# Patient Record
Sex: MALE | Race: WHITE | NOT HISPANIC OR LATINO | Employment: OTHER | URBAN - METROPOLITAN AREA
[De-identification: names, ages, dates, MRNs, and addresses within clinical notes are randomized per-mention and may not be internally consistent; named-entity substitution may affect disease eponyms.]

---

## 2017-04-11 ENCOUNTER — OFFICE VISIT (OUTPATIENT)
Dept: FAMILY MEDICINE | Facility: CLINIC | Age: 72
End: 2017-04-11

## 2017-04-11 VITALS
HEIGHT: 71 IN | BODY MASS INDEX: 30.52 KG/M2 | HEART RATE: 44 BPM | DIASTOLIC BLOOD PRESSURE: 72 MMHG | SYSTOLIC BLOOD PRESSURE: 130 MMHG | WEIGHT: 218 LBS | TEMPERATURE: 97.6 F | OXYGEN SATURATION: 98 %

## 2017-04-11 DIAGNOSIS — I10 BENIGN ESSENTIAL HYPERTENSION: ICD-10-CM

## 2017-04-11 DIAGNOSIS — Z11.59 NEED FOR HEPATITIS C SCREENING TEST: ICD-10-CM

## 2017-04-11 DIAGNOSIS — S06.9X9S: ICD-10-CM

## 2017-04-11 DIAGNOSIS — E78.2 MIXED HYPERLIPIDEMIA: ICD-10-CM

## 2017-04-11 DIAGNOSIS — I25.810 CORONARY ARTERY DISEASE INVOLVING AUTOLOGOUS ARTERY CORONARY BYPASS GRAFT WITHOUT ANGINA PECTORIS: Primary | ICD-10-CM

## 2017-04-11 PROBLEM — S06.9X9A TRAUMATIC BRAIN INJURY WITH LOSS OF CONSCIOUSNESS (H): Status: ACTIVE | Noted: 2017-04-11

## 2017-04-11 PROCEDURE — 36415 COLL VENOUS BLD VENIPUNCTURE: CPT | Performed by: FAMILY MEDICINE

## 2017-04-11 PROCEDURE — 99214 OFFICE O/P EST MOD 30 MIN: CPT | Performed by: FAMILY MEDICINE

## 2017-04-11 RX ORDER — ATORVASTATIN CALCIUM 40 MG/1
40 TABLET, FILM COATED ORAL DAILY
Qty: 90 TABLET | Refills: 1 | Status: SHIPPED | OUTPATIENT
Start: 2017-04-11 | End: 2017-10-12

## 2017-04-11 RX ORDER — ATENOLOL 25 MG/1
25 TABLET ORAL DAILY
Qty: 90 TABLET | Refills: 1 | Status: SHIPPED | OUTPATIENT
Start: 2017-04-11 | End: 2017-10-12

## 2017-04-11 NOTE — MR AVS SNAPSHOT
After Visit Summary   4/11/2017    Jose David Zhang    MRN: [id number]           Patient Information     Date Of Birth          1945        Visit Information        Provider Department      4/11/2017 9:15 AM Jose David Griffin MD [facility]        Today's Diagnoses     Coronary artery disease involving autologous artery coronary bypass graft without angina pectoris    -  1    Mixed hyperlipidemia        Benign essential hypertension        Traumatic brain injury with loss of consciousness, unspecified duration, sequela (H)        Need for hepatitis C screening test           Follow-ups after your visit        Follow-up notes from your care team     Return in about 6 months (around 10/11/2017).      Who to contact     If you have questions or need follow up information about today's clinic visit or your schedule please contact [facility] directly at [phone].  Normal or non-critical lab and imaging results will be communicated to you by [employer], letter or phone within 4 business days after the clinic has received the results. If you do not hear from us within 7 days, please contact the clinic through [employer] or phone. If you have a critical or abnormal lab result, we will notify you by phone as soon as possible.  Submit refill requests through [employer] or call your pharmacy and they will forward the refill request to us. Please allow 3 business days for your refill to be completed.          Additional Information About Your Visit        MyChart Information     [employer] gives you secure access to your electronic health record. If you see a primary care provider, you can also send messages to your care team and make appointments. If you have questions, please call your primary care clinic.  If you do not have a primary care provider, please call [phone] and they will assist you.        Care EveryWhere ID     This is your Care EveryWhere ID.  "This could be used by other organizations to access your [facility] medical records  [id number]        Your Vitals Were     Pulse Temperature Height Pulse Oximetry BMI (Body Mass Index)       44 97.6  F (36.4  C) (Oral) 1.791 m (5' 10.5\") 98% 30.84 kg/m2        Blood Pressure from Last 3 Encounters:   04/11/17 130/72   12/07/16 130/88   10/14/16 130/70    Weight from Last 3 Encounters:   04/11/17 98.9 kg (218 lb)   12/07/16 96.2 kg (212 lb)   10/14/16 98.2 kg (216 lb 9.6 oz)              We Performed the Following     COMPREHENSIVE METABOLIC PANEL (QUEST) XCMP     Hepatits C antibody (QUEST)     Lipid Profile ([employer])     VENOUS COLLECTION          Today's Medication Changes          These changes are accurate as of: 4/11/17  9:32 AM.  If you have any questions, ask your nurse or doctor.               These medicines have changed or have updated prescriptions.        Dose/Directions    atorvastatin 40 MG tablet   Commonly known as:  LIPITOR   This may have changed:  how much to take   Used for:  Mixed hyperlipidemia        Dose:  40 mg   Take 1 tablet (40 mg) by mouth daily   Quantity:  90 tablet   Refills:  1            Where to get your medicines      These medications were [id number] - [place], [place] - [address] [place] [address] [address]  [address] [facility] [address], [facility] [address]     Phone:  [phone]     atenolol 25 MG tablet    atorvastatin 40 MG tablet                Primary Care Provider Office Phone # Fax #    Jose David Griffin [phone]       [facility] [address] [facility] [address]  [facility] [address]        Thank you!     Thank you for choosing [facility]  for your care. Our goal is always to provide you with excellent care. Hearing back from our patients is one way we can continue to improve our services. Please take a few minutes to complete the written survey that you may receive in the mail after your visit with us. " Thank you!             Your Updated Medication List - Protect others around you: Learn how to safely use, store and throw away your medicines at www.disposemymeds.org.          This list is accurate as of: 4/11/17  9:32 AM.  Always use your most recent med list.                   Brand Name Dispense Instructions for use    aspirin 81 MG tablet     100    one daily       atenolol 25 MG tablet    TENORMIN    90 tablet    Take 1 tablet (25 mg) by mouth daily       atorvastatin 40 MG tablet    LIPITOR    90 tablet    Take 1 tablet (40 mg) by mouth daily       guaiFENesin-codeine 100-10 MG/5ML Soln solution    ROBITUSSIN AC    180 mL    Take 5 mLs by mouth every 4 hours as needed for cough       MULTI vitamin  MENS Tabs      Take 1 tablet by mouth daily.

## 2017-04-11 NOTE — NURSING NOTE
Jose David Zhang is here today for a fasting med check.    Pre-Visit Screening :  Immunizations : up to date  Colon Screening : is up to date  Asthma Action Test/Plan : NA  PHQ9/GAD7 :  Up to date  Pulse - regular  My Chart - accepts    CLASSIFICATION OF OVERWEIGHT AND OBESITY BY BMI                         Obesity Class           BMI(kg/m2)  Underweight                                    < 18.5  Normal                                         18.5-24.9  Overweight                                     25.0-29.9  OBESITY                     I                  30.0-34.9                              II                 35.0-39.9  EXTREME OBESITY             III                >40                             Patient's  BMI Body mass index is 30.84 kg/(m^2).  http://hin.nhlbi.nih.gov/menuplanner/menu.cgi  Questioned patient about current smoking habits.  Pt. has never smoked.  Yessica Lara, CMA

## 2017-04-11 NOTE — PROGRESS NOTES
SUBJECTIVE:                                                    Jose David Zhang is a 71 year old male who presents to clinic today for the following health issues:        Hyperlipidemia Follow-Up      Rate your low fat/cholesterol diet?: good    Taking statin?  Yes, no muscle aches from statin    Other lipid medications/supplements?:  none     Hypertension Follow-up      Outpatient blood pressures are not being checked.    Low Salt Diet: no added salt     Vascular Disease Follow-up:  Coronary Artery Disease (CAD)      Chest pain or pressure, left side neck or arm pain: No    Shortness of breath/increased sweats/nausea with exertion: No    Pain in calves walking 1-2 blocks: No    Worsened or new symptoms since last visit: No    Nitroglycerin use: no    Daily aspirin use: Yes       Amount of exercise or physical activity: 2-3 days/week for an average of 15-30 minutes    Problems taking medications regularly: No    Medication side effects: none    Diet: regular (no restrictions)      Hx TBI, seizure prior to brain tumor dx, subsequent tumor removal-no issues since    Problem list and histories reviewed & adjusted, as indicated.  Additional history: as documented    Patient Active Problem List   Diagnosis     Mixed hyperlipidemia     Postsurgical aortocoronary bypass status     BENIGN CORDELIA CEREBR MENINGES/meningioma     Health Care Home     Acromioclavicular joint arthritis     History of myocardial infarction     ACP (advance care planning)     Coronary artery disease involving autologous artery coronary bypass graft without angina pectoris     Nonintractable epilepsy without status epilepticus, unspecified epilepsy type (H)     Benign essential hypertension     Traumatic brain injury with loss of consciousness (H)     Past Surgical History:   Procedure Laterality Date     C CABG, ARTERIAL, FOUR+  10/9/01    FSD     EYE EXAM ESTABLISHED PT  2003     HC COLONOSCOPY THRU STOMA, DIAGNOSTIC      Not interested     HCL PSA,  DIAGNOSTIC (TUMOR MARKER)  2001     SIGMOIDOSCOPY,DIAGNOSTIC  1994    [facility]       Social History   Substance Use Topics     Smoking status: Never Smoker     Smokeless tobacco: Never Used     Alcohol use 1.5 oz/week     3 drink(s) per week     Family History   Problem Relation Age of Onset     CANCER No family hx of      DIABETES No family hx of      HEART DISEASE Father          Current Outpatient Prescriptions   Medication Sig Dispense Refill     atorvastatin (LIPITOR) 40 MG tablet Take 1 tablet (40 mg) by mouth daily 90 tablet 1     atenolol (TENORMIN) 25 MG tablet Take 1 tablet (25 mg) by mouth daily 90 tablet 1     Multiple Vitamin (MULTI VITAMIN MENS) TABS Take 1 tablet by mouth daily.       ASPIRIN 81 MG OR TABS one daily 100 0     guaiFENesin-codeine (ROBITUSSIN AC) 100-10 MG/5ML SOLN solution Take 5 mLs by mouth every 4 hours as needed for cough 180 mL 0     [DISCONTINUED] atorvastatin (LIPITOR) 40 MG tablet Take 1 tablet (40 mg) by mouth daily (Patient taking differently: Take 20 mg by mouth daily ) 90 tablet 1     [DISCONTINUED] atenolol (TENORMIN) 25 MG tablet Take 1 tablet (25 mg) by mouth daily 90 tablet 1     Allergies   Allergen Reactions     No Known Allergies      Recent Labs   Lab Test  10/13/16   1735  03/30/16   0938  09/29/15   1007   LDL  102  111  127   HDL  44  42  47   TRIG  98  110  114   ALT  13  21  19   CR  1.18  1.09  1.15   GFRESTIMATED  63  69  65   POTASSIUM  4.9  4.3  4.6      BP Readings from Last 3 Encounters:   04/11/17 130/72   12/07/16 130/88   10/14/16 130/70    Wt Readings from Last 3 Encounters:   04/11/17 98.9 kg (218 lb)   12/07/16 96.2 kg (212 lb)   10/14/16 98.2 kg (216 lb 9.6 oz)                    ROS:  Constitutional, HEENT, cardiovascular, pulmonary, gi and gu systems are negative, except as otherwise noted.    OBJECTIVE:                                                    /72 (BP Location: Left arm, Patient Position: Chair, Cuff Size: Adult Large)  Pulse (!) 44  " Temp 97.6  F (36.4  C) (Oral)  Ht 1.791 m (5' 10.5\")  Wt 98.9 kg (218 lb)  SpO2 98%  BMI 30.84 kg/m2  Body mass index is 30.84 kg/(m^2).   GENERAL: healthy, alert and no distress  EYES: Eyes grossly normal to inspection, PERRL and conjunctivae and sclerae normal  HENT: ear canals and TM's normal, nose and mouth without ulcers or lesions  NECK: no adenopathy, no asymmetry, masses, or scars and thyroid normal to palpation  RESP: lungs clear to auscultation - no rales, rhonchi or wheezes  CV: regular rate and rhythm, normal S1 S2, no S3 or S4, no murmur, click or rub, no peripheral edema and peripheral pulses strong  ABDOMEN: soft, nontender, no hepatosplenomegaly, no masses and bowel sounds normal  MS: no gross musculoskeletal defects noted, no edema  SKIN: no suspicious lesions or rashes  NEURO: Normal strength and tone, mentation intact and speech normal  PSYCH: mentation appears normal, affect normal/bright         ASSESSMENT:                                                        PLAN:                                                    (I25.810) Coronary artery disease involving autologous artery coronary bypass graft without angina pectoris  (primary encounter diagnosis)  Comment: stable symptomatically , pulse low today but no dizziness  Plan: atenolol (TENORMIN) 25 MG tablet        continue current medications at current doses     (E78.2) Mixed hyperlipidemia  Comment: stable  Plan: atorvastatin (LIPITOR) 40 MG tablet, Lipid         Profile (QUEST), COMPREHENSIVE METABOLIC PANEL         (QUEST) XCMP, VENOUS COLLECTION        continue current medications at current doses     (I10) Benign essential hypertension  Comment: well controlled  Plan: atenolol (TENORMIN) 25 MG tablet, COMPREHENSIVE        METABOLIC PANEL (QUEST) XCMP, VENOUS COLLECTION        continue current medications at current doses     (S06.9X9S) Traumatic brain injury with loss of consciousness, unspecified duration, sequela (H)  Comment: no " "issues  Plan:     (Z11.59) Need for hepatitis C screening test  Comment:   Plan: VENOUS COLLECTION, Hepatits C antibody (QUEST)              BMI:   Estimated body mass index is 30.84 kg/(m^2) as calculated from the following:    Height as of this encounter: 1.791 m (5' 10.5\").    Weight as of this encounter: 98.9 kg (218 lb).   Weight management plan: Discussed healthy diet and exercise guidelines and patient will follow up in 6 months in clinic to re-evaluate.      FUTURE APPOINTMENTS:       - Follow-up visit in 6 mo  Work on weight loss  Regular exercise    Jose David Griffin MD  [facility]      "

## 2017-04-12 LAB
ALBUMIN SERPL-MCNC: 4.1 G/DL (ref 3.6–5.1)
ALBUMIN/GLOB SERPL: 1.4 (CALC) (ref 1–2.5)
ALP SERPL-CCNC: 76 U/L (ref 40–115)
ALT SERPL-CCNC: 17 U/L (ref 9–46)
AST SERPL-CCNC: 20 U/L (ref 10–35)
BILIRUB SERPL-MCNC: 0.4 MG/DL (ref 0.2–1.2)
BUN SERPL-MCNC: 20 MG/DL (ref 7–25)
BUN/CREATININE RATIO: NORMAL (CALC) (ref 6–22)
CALCIUM SERPL-MCNC: 9.3 MG/DL (ref 8.6–10.3)
CHLORIDE SERPLBLD-SCNC: 107 MMOL/L (ref 98–110)
CHOLEST SERPL-MCNC: 151 MG/DL (ref 125–200)
CHOLEST/HDLC SERPL: 3.4 (CALC)
CO2 SERPL-SCNC: 23 MMOL/L (ref 20–31)
CREAT SERPL-MCNC: 1.11 MG/DL (ref 0.7–1.25)
EGFR AFRICAN AMERICAN - QUEST: 78 ML/MIN/1.73M2
GFR SERPL CREATININE-BSD FRML MDRD: 67 ML/MIN/1.73M2
GLOBULIN, CALCULATED - QUEST: 3 G/DL (CALC) (ref 1.9–3.7)
GLUCOSE - QUEST: 96 MG/DL (ref 65–99)
HCV AB - QUEST: NORMAL
HDLC SERPL-MCNC: 44 MG/DL
LDLC SERPL CALC-MCNC: 91 MG/DL (CALC)
NONHDLC SERPL-MCNC: 107 MG/DL (CALC)
POTASSIUM SERPL-SCNC: 5.3 MMOL/L (ref 3.5–5.3)
PROT SERPL-MCNC: 7.1 G/DL (ref 6.1–8.1)
SIGNAL TO CUT OFF - QUEST: 0.03
SODIUM SERPL-SCNC: 138 MMOL/L (ref 135–146)
TRIGL SERPL-MCNC: 78 MG/DL

## 2017-05-18 ENCOUNTER — TRANSFERRED RECORDS (OUTPATIENT)
Dept: FAMILY MEDICINE | Facility: CLINIC | Age: 72
End: 2017-05-18

## 2017-06-05 ENCOUNTER — OFFICE VISIT (OUTPATIENT)
Dept: FAMILY MEDICINE | Facility: CLINIC | Age: 72
End: 2017-06-05

## 2017-06-05 VITALS
DIASTOLIC BLOOD PRESSURE: 62 MMHG | WEIGHT: 215.4 LBS | HEIGHT: 70 IN | BODY MASS INDEX: 30.84 KG/M2 | OXYGEN SATURATION: 97 % | HEART RATE: 76 BPM | SYSTOLIC BLOOD PRESSURE: 112 MMHG | TEMPERATURE: 98.1 F

## 2017-06-05 DIAGNOSIS — E78.2 MIXED HYPERLIPIDEMIA: ICD-10-CM

## 2017-06-05 DIAGNOSIS — I10 BENIGN ESSENTIAL HYPERTENSION: ICD-10-CM

## 2017-06-05 DIAGNOSIS — M19.041 ARTHRITIS OF RIGHT HAND: Primary | ICD-10-CM

## 2017-06-05 DIAGNOSIS — I25.810 CORONARY ARTERY DISEASE INVOLVING AUTOLOGOUS ARTERY CORONARY BYPASS GRAFT WITHOUT ANGINA PECTORIS: ICD-10-CM

## 2017-06-05 PROCEDURE — 99213 OFFICE O/P EST LOW 20 MIN: CPT | Performed by: FAMILY MEDICINE

## 2017-06-05 RX ORDER — ATORVASTATIN CALCIUM 40 MG/1
40 TABLET, FILM COATED ORAL DAILY
Qty: 90 TABLET | Refills: 1 | Status: CANCELLED | OUTPATIENT
Start: 2017-06-05

## 2017-06-05 RX ORDER — ATENOLOL 25 MG/1
25 TABLET ORAL DAILY
Qty: 90 TABLET | Refills: 1 | Status: CANCELLED | OUTPATIENT
Start: 2017-06-05

## 2017-06-05 NOTE — NURSING NOTE
Jose David Zhang is here today [facility]    Pre-Visit Screening :  Immunizations : up to date  Colon Screening : Patient states [facility], will get us results.  Asthma Action Test/Plan : NA  PHQ9/GAD7 :  NA  Pulse - regular  My Chart - accepts    CLASSIFICATION OF OVERWEIGHT AND OBESITY BY BMI                         Obesity Class           BMI(kg/m2)  Underweight                                    < 18.5  Normal                                         18.5-24.9  Overweight                                     25.0-29.9  OBESITY                     I                  30.0-34.9                              II                 35.0-39.9  EXTREME OBESITY             III                >40                             Patient's  BMI Body mass index is 30.91 kg/(m^2).  http://hin.nhlbi.nih.gov/menuplanner/menu.cgi  Questioned patient about current smoking habits.  Pt. has never smoked.  Yessica Lara, CMA

## 2017-06-05 NOTE — MR AVS SNAPSHOT
After Visit Summary   6/5/2017    Jose David Zhang    MRN: [id number]           Patient Information     Date Of Birth          1945        Visit Information        Provider Department      6/5/2017 10:00 AM Jose David Griffin MD [facility]        Today's Diagnoses     Arthritis of right hand    -  1    Mixed hyperlipidemia        Coronary artery disease involving autologous artery coronary bypass graft without angina pectoris        Benign essential hypertension           Follow-ups after your visit        Additional Services     ORTHOPEDICS ADULT REFERRAL       Your provider has referred you to: [facility] [phone]   [email or web address]    Please be aware that coverage of these services is subject to the terms and limitations of your health insurance plan.  Call member services at your health plan with any benefit or coverage questions.      Please bring the following to your appointment:    >>   Any x-rays, CTs or MRIs which have been performed.  Contact the facility where they were done to arrange for  prior to your scheduled appointment.    >>   List of current medications   >>   This referral request   >>   Any documents/labs given to you for this referral                  Who to contact     If you have questions or need follow up information about today's clinic visit or your schedule please contact [facility] directly at [phone].  Normal or non-critical lab and imaging results will be communicated to you by MyChart, letter or phone within 4 business days after the clinic has received the results. If you do not hear from us within 7 days, please contact the clinic through MyChart or phone. If you have a critical or abnormal lab result, we will notify you by phone as soon as possible.  Submit refill requests through [employer] or call your pharmacy and they will forward the  "refill request to us. Please allow 3 business days for your refill to be completed.          Additional Information About Your Visit        [employer] Information     [employer] gives you secure access to your electronic health record. If you see a primary care provider, you can also send messages to your care team and make appointments. If you have questions, please call your primary care clinic.  If you do not have a primary care provider, please call [phone] and they will assist you.        Care EveryWhere ID     This is your Care EveryWhere ID. This could be used by other organizations to access your [place] medical records  [id number]        Your Vitals Were     Pulse Temperature Height Pulse Oximetry BMI (Body Mass Index)       76 98.1  F (36.7  C) (Oral) 1.778 m (5' 10\") 97% 30.91 kg/m2        Blood Pressure from Last 3 Encounters:   06/05/17 112/62   04/11/17 130/72   12/07/16 130/88    Weight from Last 3 Encounters:   06/05/17 97.7 kg (215 lb 6.4 oz)   04/11/17 98.9 kg (218 lb)   12/07/16 96.2 kg (212 lb)              We Performed the Following     ORTHOPEDICS ADULT REFERRAL        Primary Care Provider Office Phone # Fax #    Jose David Griffin [phone]       [facility] [address] [address]        Thank you!     Thank you for choosing [facility]  for your care. Our goal is always to provide you with excellent care. Hearing back from our patients is one way we can continue to improve our services. Please take a few minutes to complete the written survey that you may receive in the mail after your visit with us. Thank you!             Your Updated Medication List - Protect others around you: Learn how to safely use, store and throw away your medicines at www.disposemymeds.org.          This list is accurate as of: 6/5/17 10:24 AM.  Always use your most recent med list.                   Brand Name Dispense " Instructions for use    aspirin 81 MG tablet     100    one daily       atenolol 25 MG tablet    TENORMIN    90 tablet    Take 1 tablet (25 mg) by mouth daily       atorvastatin 40 MG tablet    LIPITOR    90 tablet    Take 1 tablet (40 mg) by mouth daily       MULTI vitamin  MENS Tabs      Take 1 tablet by mouth daily.

## 2017-06-05 NOTE — PROGRESS NOTES
SUBJECTIVE:  Jose David Zhang, a 71 year old male scheduled an appointment to discuss the following issues:     Mixed hyperlipidemia  Coronary artery disease involving autologous artery coronary bypass graft without angina pectoris  Benign essential hypertension  Arthritis of right hand  PT here to go over echo results from 5/17- results in epic    Pt also having issues with snapping of right hand finger joints and swelling of joints and wrist    Pt also having right hand pain and stiffness, also wrist stiffness-wants to have this evaluated for options-feels he can improve himself through positive thinking if he knows clear diagnosis    Medical, social, surgical, and family histories reviewed.    Patient Active Problem List   Diagnosis     Mixed hyperlipidemia     Postsurgical aortocoronary bypass status     BENIGN CORDELIA CEREBR MENINGES/meningioma     Health Care Home     Acromioclavicular joint arthritis     History of myocardial infarction     ACP (advance care planning)     Coronary artery disease involving autologous artery coronary bypass graft without angina pectoris     Nonintractable epilepsy without status epilepticus, unspecified epilepsy type (H)     Benign essential hypertension     Traumatic brain injury with loss of consciousness (H)     Past Medical History:   Diagnosis Date     Benign neoplasm of cerebral meninges (H) 2006    left sphenoid wing, dr kelly /meningioma     Chronic ischemic heart disease, unspecified      EPILEPSY NOS W/O MENTN INTRACTABLE/due to tumor 12/1/2006     Family history of other cardiovascular diseases      Mixed hyperlipidemia      Postsurgical 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/01     Family History   Problem Relation Age of Onset     CANCER No family hx of      DIABETES No family hx of      HEART DISEASE Father      Social History     Social History     Marital status:      Spouse name: Lilian     Number of children: 2     Years of education: 16     Occupational History      Retired      Insurance, Banking     Social History Main Topics     Smoking status: Never Smoker     Smokeless tobacco: Never Used     Alcohol use 1.5 oz/week     3 drink(s) per week     Drug use: No     Sexual activity: No     Other Topics Concern     Exercise Yes     Seat Belt Yes     Self-Exams Yes     Social History Narrative     Past Surgical History:   Procedure Laterality Date     C CABG, ARTERIAL, FOUR+  10/9/01    FSD     EYE EXAM ESTABLISHED PT  2003     HC COLONOSCOPY THRU STOMA, DIAGNOSTIC      Not interested     HCL PSA, DIAGNOSTIC (TUMOR MARKER)  2001     SIGMOIDOSCOPY,DIAGNOSTIC  1994    [facility]       Current Outpatient Prescriptions on File Prior to Visit:  atorvastatin (LIPITOR) 40 MG tablet Take 1 tablet (40 mg) by mouth daily   atenolol (TENORMIN) 25 MG tablet Take 1 tablet (25 mg) by mouth daily   Multiple Vitamin (MULTI VITAMIN MENS) TABS Take 1 tablet by mouth daily.   ASPIRIN 81 MG OR TABS one daily     No current facility-administered medications on file prior to visit.      Allergies: No known allergies    Immunization History   Administered Date(s) Administered     Influenza (High Dose) 3 valent vaccine 10/01/2014, 10/05/2016     Influenza (IIV3) 11/04/2008, 09/28/2011, 09/20/2012, 11/09/2013     Influenza Vaccine IM 3yrs+ 4 Valent IIV4 09/29/2015     Pneumococcal (PCV 13) 10/05/2016     TD (ADULT, 7+) 01/01/2001     TDAP Vaccine (Boostrix) 04/15/2009        ROS:  C: NEGATIVE for fever, chills  E: NEGATIVE for vision changes   R: NEGATIVE for significant cough or SOB  CV: NEGATIVE for chest pain, palpitations   GI: NEGATIVE for nausea, abdominal pain, heartburn, or change in bowel habits  : NEGATIVE for frequency, dysuria, or hematuria  M: NEGATIVE for significant arthralgias or myalgia  N: NEGATIVE for weakness, dizziness or paresthesias or headache    OBJECTIVE:  /62 (BP Location: Right arm, Patient Position: Chair, Cuff Size: Adult Large)  Pulse 76  Temp 98.1  F (36.7  C) (Oral)   "Ht 1.778 m (5' 10\")  Wt 97.7 kg (215 lb 6.4 oz)  SpO2 97%  BMI 30.91 kg/m2  EXAM:  GENERAL APPEARANCE: healthy, alert and no distress  EYES: EOMI,  PERRL  HENT: ear canals and TM's normal and nose and mouth without ulcers or lesions  RESP: lungs clear to auscultation - no rales, rhonchi or wheezes  CV: regular rates and rhythm, normal S1 S2, no S3 or S4 and no murmur, click or rub -  ABDOMEN:  soft, nontender, no HSM or masses and bowel sounds normal  MS: right wrist slightly stiff, fingers stiff as well, has triggering of right middle finger    ASSESSMENT/PLAN:  (M19.90) Arthritis of right hand  (primary encounter diagnosis)  Comment: likely djd with triggering  Plan: ORTHOPEDICS ADULT REFERRAL        Ortho referral    (E78.2) Mixed hyperlipidemia  Comment: stab;e  Plan: continue current medications at current doses     (I25.810) Coronary artery disease involving autologous artery coronary bypass graft without angina pectoris  Comment: stable-echo shows preserved LV function as well as size  Plan: continue current medications at current doses     (I10) Benign essential hypertension  Comment: well controlled  Plan: continue current medications at current doses      "

## 2017-07-21 ENCOUNTER — TRANSFERRED RECORDS (OUTPATIENT)
Dept: FAMILY MEDICINE | Facility: CLINIC | Age: 72
End: 2017-07-21

## 2017-07-25 ENCOUNTER — OFFICE VISIT (OUTPATIENT)
Dept: FAMILY MEDICINE | Facility: CLINIC | Age: 72
End: 2017-07-25

## 2017-07-25 VITALS
TEMPERATURE: 98.2 F | SYSTOLIC BLOOD PRESSURE: 130 MMHG | DIASTOLIC BLOOD PRESSURE: 70 MMHG | OXYGEN SATURATION: 97 % | BODY MASS INDEX: 30.88 KG/M2 | WEIGHT: 215.2 LBS | HEART RATE: 51 BPM

## 2017-07-25 DIAGNOSIS — M79.89 SWELLING OF RIGHT UPPER EXTREMITY: Primary | ICD-10-CM

## 2017-07-25 DIAGNOSIS — L03.115 CELLULITIS OF RIGHT LOWER EXTREMITY: ICD-10-CM

## 2017-07-25 PROCEDURE — 99213 OFFICE O/P EST LOW 20 MIN: CPT | Performed by: FAMILY MEDICINE

## 2017-07-25 RX ORDER — CEPHALEXIN 500 MG/1
500 CAPSULE ORAL 4 TIMES DAILY
Qty: 40 CAPSULE | Refills: 0 | Status: SHIPPED | OUTPATIENT
Start: 2017-07-25 | End: 2017-10-12

## 2017-07-25 NOTE — PROGRESS NOTES
SUBJECTIVE:  Jose David Zhang, a 72 year old male scheduled an appointment to discuss the following issues:     Swelling of right upper extremity  Cellulitis of right lower extremity  Pt here following up right wrist and arm pain and swelling-saw [facility]-had US showing likely calcified ganglions in wrist-radiology suggested MRI for further clarification if needed.  Ortho doc wanted pt to see me to order doppler of right UE as he wants to make sure no signs clot.    Pt states pain is intermittent, no worsening, present for years.    Pt also had bite of large beetle on right inner thigh- now redness slightly spreading out, sore  Medical, social, surgical, and family histories reviewed.    ROS:  C: NEGATIVE for fever, chills  R: NEGATIVE for significant cough or SOB  CV: NEGATIVE for chest pain, palpitations   N: NEGATIVE for weakness, dizziness or paresthesias or headache    OBJECTIVE:  /70 (BP Location: Right arm, Patient Position: Chair)  Pulse 51  Temp 98.2  F (36.8  C) (Oral)  Wt 97.6 kg (215 lb 3.2 oz)  SpO2 97%  BMI 30.88 kg/m2  EXAM:  GENERAL APPEARANCE: healthy, alert and no distress  RESP: lungs clear to auscultation - no rales, rhonchi or wheezes  CV: regular rates and rhythm, normal S1 S2, no S3 or S4 and no murmur, click or rub -  ABDOMEN:  soft, nontender, no HSM or masses and bowel sounds normal  MS: right wrist with nodules suspicious for ganglions, no signs vascular compromise, pulses normal  SKIN: right inner thigh with 3 cm area macular erythema surrounding small bite phil, slightly tender, slightly warm    ASSESSMENT/PLAN:  (M79.89) Swelling of right upper extremity  (primary encounter diagnosis)  Comment: ortho would like US to r/o clot-doppler ordered  Plan: US Upper Extremity Venous Duplex Right,         RADIOLOGY REFERRAL        Consider MRI before any other treatments of this region    (L03.115) Cellulitis of right lower extremity  Comment: likely early cellulitis from  bite  Plan: cephALEXin (KEFLEX) 500 MG capsule        I reviewed the risks, benefits, and possible side effects of the medication.  The patient had an opportunity to ask any questions regarding the treatment plan. The patient was encouraged to call my office if any problems.     Recheck 2 days if not better, sooner if worse

## 2017-07-25 NOTE — NURSING NOTE
Jose David is here for pain in his hand arm and rotator cuff    Pre-Visit Screening :  Immunizations : up to date  Colon Screening : is up to date  Asthma Action Test/Plan : NA  PHQ9/GAD7 :  NA  Pulse - regular  My Chart - accepts    CLASSIFICATION OF OVERWEIGHT AND OBESITY BY BMI                         Obesity Class           BMI(kg/m2)  Underweight                                    < 18.5  Normal                                         18.5-24.9  Overweight                                     25.0-29.9  OBESITY                     I                  30.0-34.9                              II                 35.0-39.9  EXTREME OBESITY             III                >40                             Patient's  BMI Body mass index is 30.88 kg/(m^2).  http://hin.nhlbi.nih.gov/menuplanner/menu.cgi  Questioned patient about current smoking habits.  Pt. has never smoked.

## 2017-07-25 NOTE — MR AVS SNAPSHOT
After Visit Summary   7/25/2017    Jose David Zhang    MRN: [id number]           Patient Information     Date Of Birth          1945        Visit Information        Provider Department      7/25/2017 11:00 AM Jose David Griffin MD [facility]        Today's Diagnoses     Swelling of right upper extremity    -  1    Cellulitis of right lower extremity           Follow-ups after your visit        Additional Services     RADIOLOGY REFERRAL       Your provider has referred you to: N: [facility] [phone]   [email or web address]    Please be aware that coverage of these services is subject to the terms and limitations of your health insurance plan.  Call member services at your health plan with any benefit or coverage questions.      Please bring the following to your appointment:    >>   Any x-rays, CTs or MRIs which have been performed.  Contact the facility where they were done to arrange for  prior to your scheduled appointment.    >>   List of current medications   >>   This referral request   >>   Any documents/labs given to you for this referral    Prior authorization is required for MRI/MRA, CT, Dexa Scans and Worker's Compensation cases.                  Future tests that were ordered for you today     Open Future Orders        Priority Expected Expires Ordered    US Upper Extremity Venous Duplex Right Routine  7/25/2018 7/25/2017            Who to contact     If you have questions or need follow up information about today's clinic visit or your schedule please contact [facility] directly at [phone].  Normal or non-critical lab and imaging results will be communicated to you by MyChart, letter or phone within 4 business days after the clinic has received the results. If you do not hear from us within 7 days, please contact the clinic through MyChart or phone. If you have a critical or abnormal lab result,  we will notify you by phone as soon as possible.  Submit refill requests through Opiatalk or call your pharmacy and they will forward the refill request to us. Please allow 3 business days for your refill to be completed.          Additional Information About Your Visit        AltraTechhart Information     Opiatalk gives you secure access to your electronic health record. If you see a primary care provider, you can also send messages to your care team and make appointments. If you have questions, please call your primary care clinic.  If you do not have a primary care provider, please call [phone] and they will assist you.        Care EveryWhere ID     This is your Care EveryWhere ID. This could be used by other organizations to access your [place] medical records  [id number]        Your Vitals Were     Pulse Temperature Pulse Oximetry BMI (Body Mass Index)          51 98.2  F (36.8  C) (Oral) 97% 30.88 kg/m2         Blood Pressure from Last 3 Encounters:   07/25/17 130/70   06/05/17 112/62   04/11/17 130/72    Weight from Last 3 Encounters:   07/25/17 97.6 kg (215 lb 3.2 oz)   06/05/17 97.7 kg (215 lb 6.4 oz)   04/11/17 98.9 kg (218 lb)              We Performed the Following     RADIOLOGY REFERRAL          Today's Medication Changes          These changes are accurate as of: 7/25/17 11:27 AM.  If you have any questions, ask your nurse or doctor.               Start taking these medicines.        Dose/Directions    cephALEXin 500 MG capsule   Commonly known as:  KEFLEX   Used for:  Cellulitis of right lower extremity   Started by:  Jose David Griffin MD        Dose:  500 mg   Take 1 capsule (500 mg) by mouth 4 times daily   Quantity:  40 capsule   Refills:  0            Where to get your medicines      These medications [id number] - [place], [place] - [address] [place] [address] [address]  [address], [facility] [address]     Phone:  [phone]     cephALEXin 500 MG capsule                 Primary Care Provider Office Phone # Fax #    Jose David Kulwant Griffin [phone]       [facility] [address] [facility] [address]  [facility] [address]        Equal Access to Services     TAMARA GRIJALVA : Hadii linda ku israelo Sogustavoali, waaxda luqadaha, qaybta kaalmada adeegyada, waxcecelia curtisn sachin loera laIndirakenneth barnes. So [facility] [phone].    ATENCIÓN: Si habla español, tiene a hernandez disposición servicios gratuitos de asistencia lingüística. Llame al [phone].    We comply with applicable federal civil rights laws and [place] laws. We do not discriminate on the basis of race, color, national origin, age, disability sex, sexual orientation or gender identity.            Thank you!     Thank you for choosing [facility]  for your care. Our goal is always to provide you with excellent care. Hearing back from our patients is one way we can continue to improve our services. Please take a few minutes to complete the written survey that you may receive in the mail after your visit with us. Thank you!             Your Updated Medication List - Protect others around you: Learn how to safely use, store and throw away your medicines at www.disposemymeds.org.          This list is accurate as of: 7/25/17 11:27 AM.  Always use your most recent med list.                   Brand Name Dispense Instructions for use Diagnosis    aspirin 81 MG tablet     100    one daily    Mixed hyperlipidemia       atenolol 25 MG tablet    TENORMIN    90 tablet    Take 1 tablet (25 mg) by mouth daily    Coronary artery disease involving autologous artery coronary bypass graft without angina pectoris, Benign essential hypertension       atorvastatin 40 MG tablet    LIPITOR    90 tablet    Take 1 tablet (40 mg) by mouth daily    Mixed hyperlipidemia       cephALEXin 500 MG capsule    KEFLEX    40 capsule    Take 1 capsule (500 mg) by mouth 4 times daily    Cellulitis of right lower  extremity       MULTI vitamin  MENS Tabs      Take 1 tablet by mouth daily.

## 2017-07-25 NOTE — NURSING NOTE
Telephone call made to schedule Jose David Zhang for the following: Doppler RUE- R/O clot    Date: 07/26/2017  Time: 1:00 PM  Place: [facility] [place]

## 2017-07-26 ENCOUNTER — TELEPHONE (OUTPATIENT)
Dept: FAMILY MEDICINE | Facility: CLINIC | Age: 72
End: 2017-07-26

## 2017-10-12 ENCOUNTER — OFFICE VISIT (OUTPATIENT)
Dept: FAMILY MEDICINE | Facility: CLINIC | Age: 72
End: 2017-10-12

## 2017-10-12 VITALS
SYSTOLIC BLOOD PRESSURE: 114 MMHG | BODY MASS INDEX: 29.63 KG/M2 | TEMPERATURE: 99 F | DIASTOLIC BLOOD PRESSURE: 76 MMHG | HEART RATE: 50 BPM | HEIGHT: 70 IN | WEIGHT: 207 LBS | OXYGEN SATURATION: 99 %

## 2017-10-12 DIAGNOSIS — E78.2 MIXED HYPERLIPIDEMIA: Primary | ICD-10-CM

## 2017-10-12 DIAGNOSIS — I25.810 CORONARY ARTERY DISEASE INVOLVING AUTOLOGOUS ARTERY CORONARY BYPASS GRAFT WITHOUT ANGINA PECTORIS: ICD-10-CM

## 2017-10-12 DIAGNOSIS — I10 BENIGN ESSENTIAL HYPERTENSION: ICD-10-CM

## 2017-10-12 DIAGNOSIS — Z23 NEED FOR VACCINATION: ICD-10-CM

## 2017-10-12 PROCEDURE — 90732 PPSV23 VACC 2 YRS+ SUBQ/IM: CPT | Performed by: FAMILY MEDICINE

## 2017-10-12 PROCEDURE — 36415 COLL VENOUS BLD VENIPUNCTURE: CPT | Performed by: FAMILY MEDICINE

## 2017-10-12 PROCEDURE — G0009 ADMIN PNEUMOCOCCAL VACCINE: HCPCS | Performed by: FAMILY MEDICINE

## 2017-10-12 PROCEDURE — 99213 OFFICE O/P EST LOW 20 MIN: CPT | Mod: 25 | Performed by: FAMILY MEDICINE

## 2017-10-12 RX ORDER — ATENOLOL 25 MG/1
25 TABLET ORAL DAILY
Qty: 90 TABLET | Refills: 1 | Status: SHIPPED | OUTPATIENT
Start: 2017-10-12 | End: 2018-04-30

## 2017-10-12 RX ORDER — ATORVASTATIN CALCIUM 40 MG/1
40 TABLET, FILM COATED ORAL DAILY
Qty: 90 TABLET | Refills: 1 | Status: SHIPPED | OUTPATIENT
Start: 2017-10-12 | End: 2018-04-30

## 2017-10-12 NOTE — PROGRESS NOTES
SUBJECTIVE:                                                    Jose David Zhang is a 72 year old male who presents to clinic today for the following health issues:      Hyperlipidemia Follow-Up      Rate your low fat/cholesterol diet?: good    Taking statin?  Yes, no muscle aches from statin    Other lipid medications/supplements?:  none    Hypertension Follow-up      Outpatient blood pressures are not being checked.    Low Salt Diet: no added salt    Vascular Disease Follow-up:  Coronary Artery Disease (CAD)      Chest pain or pressure, left side neck or arm pain: No    Shortness of breath/increased sweats/nausea with exertion: No    Pain in calves walking 1-2 blocks: No    Worsened or new symptoms since last visit: No    Nitroglycerin use: no    Daily aspirin use: Yes            Amount of exercise or physical activity: 2-3 days/week for an average of 30-45 minutes    Problems taking medications regularly: No    Medication side effects: none  Diet: regular (no restrictions)          Problem list and histories reviewed & adjusted, as indicated.  Additional history:     Patient Active Problem List   Diagnosis     Mixed hyperlipidemia     Postsurgical aortocoronary bypass status     BENIGN CORDELIA CEREBR MENINGES/meningioma     Health Care Home     Acromioclavicular joint arthritis     History of myocardial infarction     ACP (advance care planning)     Coronary artery disease involving autologous artery coronary bypass graft without angina pectoris     Nonintractable epilepsy without status epilepticus, unspecified epilepsy type (H)     Benign essential hypertension     Traumatic brain injury with loss of consciousness (H)     Past Surgical History:   Procedure Laterality Date     C CABG, ARTERIAL, FOUR+  10/9/01    FSD     EYE EXAM ESTABLISHED PT  2003     HC COLONOSCOPY THRU STOMA, DIAGNOSTIC  11/01/2016    Patient Reported     HCL PSA, DIAGNOSTIC (TUMOR MARKER)  2001     SIGMOIDOSCOPY,DIAGNOSTIC  1994    [facility]      "  Social History   Substance Use Topics     Smoking status: Never Smoker     Smokeless tobacco: Never Used     Alcohol use 1.5 oz/week     3 drink(s) per week     Family History   Problem Relation Age of Onset     CANCER No family hx of      DIABETES No family hx of      HEART DISEASE Father          Current Outpatient Prescriptions   Medication Sig Dispense Refill     atorvastatin (LIPITOR) 40 MG tablet Take 1 tablet (40 mg) by mouth daily 90 tablet 1     atenolol (TENORMIN) 25 MG tablet Take 1 tablet (25 mg) by mouth daily 90 tablet 1     Multiple Vitamin (MULTI VITAMIN MENS) TABS Take 1 tablet by mouth daily.       ASPIRIN 81 MG OR TABS one daily 100 0     Allergies   Allergen Reactions     No Known Allergies      Recent Labs   Lab Test  04/11/17   0939  10/13/16   1735  03/30/16   0938   LDL  91  102  111   HDL  44  44  42   TRIG  78  98  110   ALT  17  13  21   CR  1.11  1.18  1.09   GFRESTIMATED  67  63  69   POTASSIUM  5.3  4.9  4.3      BP Readings from Last 3 Encounters:   10/12/17 114/76   07/25/17 130/70   06/05/17 112/62    Wt Readings from Last 3 Encounters:   10/12/17 93.9 kg (207 lb)   07/25/17 97.6 kg (215 lb 3.2 oz)   06/05/17 97.7 kg (215 lb 6.4 oz)                          ROS:  Constitutional, HEENT, cardiovascular, pulmonary, gi and gu systems are negative, except as otherwise noted.      OBJECTIVE:   /76 (BP Location: Left arm, Patient Position: Chair, Cuff Size: Adult Large)  Pulse 50  Temp 99  F (37.2  C) (Oral)  Ht 1.778 m (5' 10\")  Wt 93.9 kg (207 lb)  SpO2 99%  BMI 29.7 kg/m2  Body mass index is 29.7 kg/(m^2).   GENERAL: healthy, alert and no distress  NECK: no adenopathy, no asymmetry, masses, or scars and thyroid normal to palpation  RESP: lungs clear to auscultation - no rales, rhonchi or wheezes  CV: regular rate and rhythm, normal S1 S2, no S3 or S4, no murmur, click or rub, no peripheral edema and peripheral pulses strong  ABDOMEN: soft, nontender, no hepatosplenomegaly, " "no masses and bowel sounds normal  MS: no gross musculoskeletal defects noted, no edema    Diagnostic Test Results:  No results found for this or any previous visit (from the past 24 hour(s)).    ASSESSMENT:       PLAN:   (E78.2) Mixed hyperlipidemia  (primary encounter diagnosis)  Comment: well controlled  Plan: atorvastatin (LIPITOR) 40 MG tablet, Lipid         Profile (QUEST), COMPREHENSIVE METABOLIC PANEL         (QUEST) XCMP, VENOUS COLLECTION        continue current medications at current doses     (I10) Benign essential hypertension  Comment: well controlled  Plan: COMPREHENSIVE METABOLIC PANEL (QUEST) XCMP,         VENOUS COLLECTION, atenolol (TENORMIN) 25 MG         tablet        continue current medications at current doses if can find atenolol-he wants to try    (I25.810) Coronary artery disease involving autologous artery coronary bypass graft without angina pectoris  Comment: stable  Plan: atorvastatin (LIPITOR) 40 MG tablet, Lipid         Profile (QUEST), COMPREHENSIVE METABOLIC PANEL         (QUEST) XCMP, VENOUS COLLECTION, atenolol         (TENORMIN) 25 MG tablet        continue current medications at current doses     (Z23) Need for vaccination  Comment:   Plan: VACCINE ADMINISTRATION, INITIAL, PNEUMOCOCCAL         VACCINE,ADULT,SQ OR IM              BMI:   Estimated body mass index is 29.7 kg/(m^2) as calculated from the following:    Height as of this encounter: 1.778 m (5' 10\").    Weight as of this encounter: 93.9 kg (207 lb).   Weight management plan: Discussed healthy diet and exercise guidelines and patient will follow up in 6 months in clinic to re-evaluate.        FUTURE APPOINTMENTS:       - Follow-up visit in 6 mo  Work on weight loss  Regular exercise    Jose David Griffin MD  [facility]  "

## 2017-10-12 NOTE — MR AVS SNAPSHOT
After Visit Summary   10/12/2017    Jose David Zhang    MRN: [id number]           Patient Information     Date Of Birth          1945        Visit Information        Provider Department      10/12/2017 9:45 AM Jose David Griffin MD [facility]        Today's Diagnoses     Mixed hyperlipidemia    -  1    Benign essential hypertension        Coronary artery disease involving autologous artery coronary bypass graft without angina pectoris        Need for vaccination           Follow-ups after your visit        Follow-up notes from your care team     Return in about 6 months (around 4/12/2018).      Who to contact     If you have questions or need follow up information about today's clinic visit or your schedule please contact [facility] directly at [phone].  Normal or non-critical lab and imaging results will be communicated to you by Pipewisehart, letter or phone within 4 business days after the clinic has received the results. If you do not hear from us within 7 days, please contact the clinic through Pipewisehart or phone. If you have a critical or abnormal lab result, we will notify you by phone as soon as possible.  Submit refill requests through [employer] or call your pharmacy and they will forward the refill request to us. Please allow 3 business days for your refill to be completed.          Additional Information About Your Visit        MyChart Information     [employer] gives you secure access to your electronic health record. If you see a primary care provider, you can also send messages to your care team and make appointments. If you have questions, please call your primary care clinic.  If you do not have a primary care provider, please call [phone] and they will assist you.        Care EveryWhere ID     This is your Care EveryWhere ID. This could be used by other organizations to access your [place] medical records  [id number]       "  Your Vitals Were     Pulse Temperature Height Pulse Oximetry BMI (Body Mass Index)       50 99  F (37.2  C) (Oral) 1.778 m (5' 10\") 99% 29.7 kg/m2        Blood Pressure from Last 3 Encounters:   10/12/17 114/76   07/25/17 130/70   06/05/17 112/62    Weight from Last 3 Encounters:   10/12/17 93.9 kg (207 lb)   07/25/17 97.6 kg (215 lb 3.2 oz)   06/05/17 97.7 kg (215 lb 6.4 oz)              We Performed the Following     COMPREHENSIVE METABOLIC PANEL (QUEST) XCMP     Lipid Profile (QUEST)     PNEUMOCOCCAL VACCINE,ADULT,SQ OR IM     VACCINE ADMINISTRATION, INITIAL     VENOUS COLLECTION          Where to get your medicines      These medications were sent to [facility] - [place], [place] - [address]  [address], [facility] [address]     Phone:  [phone]     atorvastatin 40 MG tablet         Some of these will need a paper prescription and others can be bought over the counter.  Ask your nurse if you have questions.     Bring a paper prescription for each of these medications     atenolol 25 MG tablet          Primary Care Provider Office Phone # Fax #    Jose David Griffin [phone] [address] [facility] [address]  [facility] [address]        Equal Access to Services     TAMARA GRIJALVA AH: Hadii linda ku hadasho Soomaali, waaxda luqadaha, qaybta kaalmada adeegyada, waxay cathie haykenneth mike . So [facility] [place] [phone].    ATENCIÓN: Si habla español, tiene a hernandez disposición servicios gratuitos de asistencia lingüística. Llame al [phone].    We comply with applicable federal civil rights laws and [place] laws. We do not discriminate on the basis of race, color, national origin, age, disability, sex, sexual orientation, or gender identity.            Thank you!     Thank you for choosing [facility]  for your care. Our goal is always to provide you with excellent care. Hearing back from our patients is one way we can " continue to improve our services. Please take a few minutes to complete the written survey that you may receive in the mail after your visit with us. Thank you!             Your Updated Medication List - Protect others around you: Learn how to safely use, store and throw away your medicines at www.disposemymeds.org.          This list is accurate as of: 10/12/17 10:57 AM.  Always use your most recent med list.                   Brand Name Dispense Instructions for use Diagnosis    aspirin 81 MG tablet     100    one daily    Mixed hyperlipidemia       atenolol 25 MG tablet    TENORMIN    90 tablet    Take 1 tablet (25 mg) by mouth daily    Coronary artery disease involving autologous artery coronary bypass graft without angina pectoris, Benign essential hypertension       atorvastatin 40 MG tablet    LIPITOR    90 tablet    Take 1 tablet (40 mg) by mouth daily    Mixed hyperlipidemia, Coronary artery disease involving autologous artery coronary bypass graft without angina pectoris       MULTI vitamin  MENS Tabs      Take 1 tablet by mouth daily.

## 2017-10-12 NOTE — NURSING NOTE
Jose David AGUILAR Leatha is here today for a fasting medication recheck.    Pre-Visit Screening :  Immunizations : not up to date - Pneumococcal 23 Today  Colon Screening : Declined  Asthma Action Test/Plan : NA  PHQ9/GAD7 :  NA    Pulse - regular  My Chart - accepts    CLASSIFICATION OF OVERWEIGHT AND OBESITY BY BMI                         Obesity Class           BMI(kg/m2)  Underweight                                    < 18.5  Normal                                         18.5-24.9  Overweight                                     25.0-29.9  OBESITY                     I                  30.0-34.9                              II                 35.0-39.9  EXTREME OBESITY             III                >40                             Patient's  BMI Body mass index is 29.7 kg/(m^2).  http://hin.nhlbi.nih.gov/menuplanner/menu.cgi  Questioned patient about current smoking habits.  Pt. has never smoked.    Yessica Lara, CMA

## 2017-10-13 LAB
ALBUMIN SERPL-MCNC: 4.2 G/DL (ref 3.6–5.1)
ALBUMIN/GLOB SERPL: 1.4 (CALC) (ref 1–2.5)
ALP SERPL-CCNC: 81 U/L (ref 40–115)
ALT SERPL-CCNC: 27 U/L (ref 9–46)
AST SERPL-CCNC: 27 U/L (ref 10–35)
BILIRUB SERPL-MCNC: 0.5 MG/DL (ref 0.2–1.2)
BUN SERPL-MCNC: 19 MG/DL (ref 7–25)
BUN/CREATININE RATIO: 15 (CALC) (ref 6–22)
CALCIUM SERPL-MCNC: 9.8 MG/DL (ref 8.6–10.3)
CHLORIDE SERPLBLD-SCNC: 109 MMOL/L (ref 98–110)
CHOLEST SERPL-MCNC: 121 MG/DL
CHOLEST/HDLC SERPL: 2.6 (CALC)
CO2 SERPL-SCNC: 24 MMOL/L (ref 20–31)
CREAT SERPL-MCNC: 1.24 MG/DL (ref 0.7–1.18)
EGFR AFRICAN AMERICAN - QUEST: 68 ML/MIN/1.73M2
GFR SERPL CREATININE-BSD FRML MDRD: 59 ML/MIN/1.73M2
GLOBULIN, CALCULATED - QUEST: 3 G/DL (CALC) (ref 1.9–3.7)
GLUCOSE - QUEST: 105 MG/DL (ref 65–99)
HDLC SERPL-MCNC: 47 MG/DL
LDLC SERPL CALC-MCNC: 57 MG/DL (CALC)
NONHDLC SERPL-MCNC: 74 MG/DL (CALC)
POTASSIUM SERPL-SCNC: 5.1 MMOL/L (ref 3.5–5.3)
PROT SERPL-MCNC: 7.2 G/DL (ref 6.1–8.1)
SODIUM SERPL-SCNC: 141 MMOL/L (ref 135–146)
TRIGL SERPL-MCNC: 90 MG/DL

## 2017-12-19 ENCOUNTER — OFFICE VISIT (OUTPATIENT)
Dept: FAMILY MEDICINE | Facility: CLINIC | Age: 72
End: 2017-12-19

## 2017-12-19 VITALS
TEMPERATURE: 98.2 F | WEIGHT: 215 LBS | OXYGEN SATURATION: 96 % | SYSTOLIC BLOOD PRESSURE: 128 MMHG | HEIGHT: 71 IN | BODY MASS INDEX: 30.1 KG/M2 | DIASTOLIC BLOOD PRESSURE: 80 MMHG | HEART RATE: 62 BPM

## 2017-12-19 DIAGNOSIS — J01.01 ACUTE RECURRENT MAXILLARY SINUSITIS: Primary | ICD-10-CM

## 2017-12-19 PROCEDURE — 99213 OFFICE O/P EST LOW 20 MIN: CPT | Performed by: PHYSICIAN ASSISTANT

## 2017-12-19 RX ORDER — CODEINE PHOSPHATE AND GUAIFENESIN 10; 100 MG/5ML; MG/5ML
1 SOLUTION ORAL EVERY 4 HOURS PRN
Qty: 236 ML | Refills: 0 | Status: SHIPPED | OUTPATIENT
Start: 2017-12-19 | End: 2018-04-30

## 2017-12-19 RX ORDER — AZITHROMYCIN 250 MG/1
TABLET, FILM COATED ORAL
Qty: 6 TABLET | Refills: 0 | Status: SHIPPED | OUTPATIENT
Start: 2017-12-19 | End: 2018-04-30

## 2017-12-19 NOTE — PROGRESS NOTES
"CC: Cough, sinus    History:  7 days ago, he started having a headache, where it hurt between his eyes. After a few days, his sinus pain seemed to get better, but they are still draining.  Continues to have cough, and is coughing up green mucous throughout day and night. Mild shortness of breath. Took a couple ASA for headache, and this has resolved. Has been using Virtussin A/C that he had from last year.      No fever, sweats, chills, low energy, fatigue.       PMH, MEDICATIONS, ALLERGIES, SOCIAL AND FAMILY HISTORY in [facility] and reviewed by me personally.      ROS negative other than the symptoms noted above in the HPI.        Examination   /80 (BP Location: Left arm, Patient Position: Chair, Cuff Size: Adult Large)  Pulse 62  Temp 98.2  F (36.8  C) (Oral)  Ht 1.803 m (5' 11\")  Wt 97.5 kg (215 lb)  SpO2 96%  BMI 29.99 kg/m2       Constitutional: Sitting comfortably, in no acute distress. Vital signs noted  Eyes: pupils equal round reactive to light and accomodation, extra ocular movements intact  Ears: external canals and TMs free of abnormalities  Nose: patent, without mucosal abnormalities  Mouth and throat: without erythema or lesions of the mucosa  Neck:  no adenopathy, trachea midline and normal to palpation  Cardiovascular:  regular rate and rhythm, no murmurs, clicks, or gallops  Respiratory:  normal respiratory rate and rhythm, lungs clear to auscultation  SKIN: No jaundice/pallor/rash.   Psychiatric: mentation appears normal and affect normal/bright        A/P    ICD-10-CM    1. Acute recurrent maxillary sinusitis J01.01 azithromycin (ZITHROMAX) 250 MG tablet     guaiFENesin-codeine (ROBITUSSIN AC) 100-10 MG/5ML SOLN solution       DISCUSSION: Given mixed and changing symptoms, difficult to discern whether sinusitis or bronchitis. Informed him that given it has only be 7 days, he should continue to use symptomatic therapies. Wrote new script for Robitussin AC. Given history of success with " similar symptoms on this timeline, if still having symptoms in 3 days recommended he complete a z-pack. Takes with food. Monitor for side effects. Contact me 1 week after starting z-pack if not significantly improving.     follow up visit: As needed    Lucy Mclaughlin PA-C  [facility]

## 2017-12-19 NOTE — MR AVS SNAPSHOT
"              After Visit Summary   12/19/2017    Jose David hZang    MRN: [id number]           Patient Information     Date Of Birth          1945        Visit Information        Provider Department      12/19/2017 12:00 PM Lucy Mclaughlin PA-C [facility]        Today's Diagnoses     Acute recurrent maxillary sinusitis    -  1       Follow-ups after your visit        Follow-up notes from your care team     Return if symptoms worsen or fail to improve.      Who to contact     If you have questions or need follow up information about today's clinic visit or your schedule please contact [facility] directly at [phone].  Normal or non-critical lab and imaging results will be communicated to you by MyChart, letter or phone within 4 business days after the clinic has received the results. If you do not hear from us within 7 days, please contact the clinic through [employer] or phone. If you have a critical or abnormal lab result, we will notify you by phone as soon as possible.  Submit refill requests through [employer] or call your pharmacy and they will forward the refill request to us. Please allow 3 business days for your refill to be completed.          Additional Information About Your Visit        MyChart Information     [employer] gives you secure access to your electronic health record. If you see a primary care provider, you can also send messages to your care team and make appointments. If you have questions, please call your primary care clinic.  If you do not have a primary care provider, please call [phone] and they will assist you.        Care EveryWhere ID     This is your Care EveryWhere ID. This could be used by other organizations to access your [place] medical records  [id number]        Your Vitals Were     Pulse Temperature Height Pulse Oximetry BMI (Body Mass Index)       62 98.2  F (36.8  C) (Oral) 1.803 m (5' 11\") 96% 29.99 kg/m2        " Blood Pressure from Last 3 Encounters:   12/19/17 128/80   10/12/17 114/76   07/25/17 130/70    Weight from Last 3 Encounters:   12/19/17 97.5 kg (215 lb)   10/12/17 93.9 kg (207 lb)   07/25/17 97.6 kg (215 lb 3.2 oz)              Today, you had the following     No orders found for display         Today's Medication Changes          These changes are accurate as of: 12/19/17 11:59 PM.  If you have any questions, ask your nurse or doctor.               Start taking these medicines.        Dose/Directions    azithromycin 250 MG tablet   Commonly known as:  ZITHROMAX   Used for:  Acute recurrent maxillary sinusitis   Started by:  Lucy Mclaughlin PA-C        Two tablets first day, then one tablet daily for four days.   Quantity:  6 tablet   Refills:  0       guaiFENesin-codeine 100-10 MG/5ML Soln solution   Commonly known as:  ROBITUSSIN AC   Used for:  Acute recurrent maxillary sinusitis   Started by:  Lucy Mclaughlin PA-C        Dose:  1 tsp.   Take 5 mLs by mouth every 4 hours as needed for cough   Quantity:  236 mL   Refills:  0            Where to get your medicines      These medications were sent to [facility] [address] [place] [address] [address] AT [place] [address] & [facility]  [address], [facility] [address]     Phone:  [phone]     azithromycin 250 MG tablet         Some of these will need a paper prescription and others can be bought over the counter.  Ask your nurse if you have questions.     Bring a paper prescription for each of these medications     guaiFENesin-codeine 100-10 MG/5ML Soln solution                Primary Care Provider Office Phone # Fax #    Jose David Griffin [phone] [address] [facility] [address]  [facility] [address]        Equal Access to Services     [facility] VALENTINE AH: Olive woodo Sosegundo, waaxda luqadaha, qaybta kaalmada adeegyada, waxay cathie barnes. So [facility]  [phone].    ATENCIÓN: Si serg isaacs, tiene a hernandez disposición servicios gratuitos de asistencia lingüística. Dewey saldana [phone].    We comply with applicable federal civil rights laws and [place] laws. We do not discriminate on the basis of race, color, national origin, age, disability, sex, sexual orientation, or gender identity.            Thank you!     Thank you for choosing [facility]  for your care. Our goal is always to provide you with excellent care. Hearing back from our patients is one way we can continue to improve our services. Please take a few minutes to complete the written survey that you may receive in the mail after your visit with us. Thank you!             Your Updated Medication List - Protect others around you: Learn how to safely use, store and throw away your medicines at www.disposemymeds.org.          This list is accurate as of: 12/19/17 11:59 PM.  Always use your most recent med list.                   Brand Name Dispense Instructions for use Diagnosis    aspirin 81 MG tablet     100    one daily    Mixed hyperlipidemia       atenolol 25 MG tablet    TENORMIN    90 tablet    Take 1 tablet (25 mg) by mouth daily    Coronary artery disease involving autologous artery coronary bypass graft without angina pectoris, Benign essential hypertension       atorvastatin 40 MG tablet    LIPITOR    90 tablet    Take 1 tablet (40 mg) by mouth daily    Mixed hyperlipidemia, Coronary artery disease involving autologous artery coronary bypass graft without angina pectoris       azithromycin 250 MG tablet    ZITHROMAX    6 tablet    Two tablets first day, then one tablet daily for four days.    Acute recurrent maxillary sinusitis       guaiFENesin-codeine 100-10 MG/5ML Soln solution    ROBITUSSIN AC    236 mL    Take 5 mLs by mouth every 4 hours as needed for cough    Acute recurrent maxillary sinusitis       MULTI vitamin  MENS Tabs      Take 1 tablet by mouth daily.

## 2018-04-30 ENCOUNTER — OFFICE VISIT (OUTPATIENT)
Dept: FAMILY MEDICINE | Facility: CLINIC | Age: 73
End: 2018-04-30

## 2018-04-30 VITALS
SYSTOLIC BLOOD PRESSURE: 124 MMHG | BODY MASS INDEX: 31.24 KG/M2 | OXYGEN SATURATION: 98 % | WEIGHT: 224 LBS | DIASTOLIC BLOOD PRESSURE: 84 MMHG | TEMPERATURE: 98 F | HEART RATE: 66 BPM

## 2018-04-30 DIAGNOSIS — I10 BENIGN ESSENTIAL HYPERTENSION: ICD-10-CM

## 2018-04-30 DIAGNOSIS — I25.810 CORONARY ARTERY DISEASE INVOLVING AUTOLOGOUS ARTERY CORONARY BYPASS GRAFT WITHOUT ANGINA PECTORIS: ICD-10-CM

## 2018-04-30 DIAGNOSIS — E78.2 MIXED HYPERLIPIDEMIA: ICD-10-CM

## 2018-04-30 PROCEDURE — 36415 COLL VENOUS BLD VENIPUNCTURE: CPT | Performed by: FAMILY MEDICINE

## 2018-04-30 PROCEDURE — 99214 OFFICE O/P EST MOD 30 MIN: CPT | Performed by: FAMILY MEDICINE

## 2018-04-30 RX ORDER — ATENOLOL 25 MG/1
25 TABLET ORAL DAILY
Qty: 90 TABLET | Refills: 1 | Status: SHIPPED | OUTPATIENT
Start: 2018-04-30 | End: 2018-10-15

## 2018-04-30 RX ORDER — ATORVASTATIN CALCIUM 40 MG/1
40 TABLET, FILM COATED ORAL DAILY
Qty: 90 TABLET | Refills: 1 | Status: SHIPPED | OUTPATIENT
Start: 2018-04-30 | End: 2018-10-15

## 2018-04-30 NOTE — MR AVS SNAPSHOT
After Visit Summary   4/30/2018    Jose David Zhang    MRN: [id number]           Patient Information     Date Of Birth          1945        Visit Information        Provider Department      4/30/2018 9:00 AM Jose David Griffin MD [facility]        Today's Diagnoses     Coronary artery disease involving autologous artery coronary bypass graft without angina pectoris        Benign essential hypertension        Mixed hyperlipidemia           Follow-ups after your visit        Follow-up notes from your care team     Return in about 6 months (around 10/30/2018).      Who to contact     If you have questions or need follow up information about today's clinic visit or your schedule please contact [facility] directly at [phone].  Normal or non-critical lab and imaging results will be communicated to you by [email or web address], letter or phone within 4 business days after the clinic has received the results. If you do not hear from us within 7 days, please contact the clinic through [email or web address] or phone. If you have a critical or abnormal lab result, we will notify you by phone as soon as possible.  Submit refill requests through [employer] or call your pharmacy and they will forward the refill request to us. Please allow 3 business days for your refill to be completed.          Additional Information About Your Visit        MyChart Information     [employer] gives you secure access to your electronic health record. If you see a primary care provider, you can also send messages to your care team and make appointments. If you have questions, please call your primary care clinic.  If you do not have a primary care provider, please call [phone] and they will assist you.        Care EveryWhere ID     This is your Care EveryWhere ID. This could be used by other organizations to access your [place] medical records  [id number]        Your Vitals Were     Pulse  Temperature Pulse Oximetry BMI (Body Mass Index)          66 98  F (36.7  C) (Oral) 98% 31.24 kg/m2         Blood Pressure from Last 3 Encounters:   04/30/18 124/84   12/19/17 128/80   10/12/17 114/76    Weight from Last 3 Encounters:   04/30/18 101.6 kg (224 lb)   12/19/17 97.5 kg (215 lb)   10/12/17 93.9 kg (207 lb)              We Performed the Following     COMPREHENSIVE METABOLIC PANEL (QUEST) XCMP     Lipid Profile (QUEST)     VENOUS COLLECTION          Where to get your medicines      These medications were sent to [facility] - [place], [place] - [address] AT [address]  [address], [facility] [address]     Phone:  [phone]     atenolol 25 MG tablet    atorvastatin 40 MG tablet          Primary Care Provider Office Phone # Fax #    Jose David Griffin [phone] [address] [facility] [address]  [facility] [address]        Equal Access to Services     : Hadii aad ku hadasho Soomaali, waaxda luqadaha, qaybta kaalmada adeegyada, waxcecelia mike . So [facility] [phone].    ATENCIÓN: Si habla español, tiene a hernandez disposición servicios gratuitos de asistencia lingüística. Llame al [phone].    We comply with applicable federal civil rights laws and [place] laws. We do not discriminate on the basis of race, color, national origin, age, disability, sex, sexual orientation, or gender identity.            Thank you!     Thank you for choosing [facility]  for your care. Our goal is always to provide you with excellent care. Hearing back from our patients is one way we can continue to improve our services. Please take a few minutes to complete the written survey that you may receive in the mail after your visit with us. Thank you!             Your Updated Medication List - Protect others around you: Learn how to safely use, store and throw away your medicines at  www.disposemymeds.org.          This list is accurate as of 4/30/18  9:17 AM.  Always use your most recent med list.                   Brand Name Dispense Instructions for use Diagnosis    aspirin 81 MG tablet     100    one daily    Mixed hyperlipidemia       atenolol 25 MG tablet    TENORMIN    90 tablet    Take 1 tablet (25 mg) by mouth daily    Coronary artery disease involving autologous artery coronary bypass graft without angina pectoris, Benign essential hypertension       atorvastatin 40 MG tablet    LIPITOR    90 tablet    Take 1 tablet (40 mg) by mouth daily    Mixed hyperlipidemia, Coronary artery disease involving autologous artery coronary bypass graft without angina pectoris       MULTI vitamin  MENS Tabs      Take 1 tablet by mouth daily.

## 2018-04-30 NOTE — NURSING NOTE
Jam is here for a fasting med check    Pre-Visit Screening :  Immunizations : up to date  Colon Screening : is up to date  Asthma Action Test/Plan : ramirez  PHQ9/GAD7 :  Na    Pulse - regular  My Chart - accepts    CLASSIFICATION OF OVERWEIGHT AND OBESITY BY BMI                         Obesity Class           BMI(kg/m2)  Underweight                                    < 18.5  Normal                                         18.5-24.9  Overweight                                     25.0-29.9  OBESITY                     I                  30.0-34.9                              II                 35.0-39.9  EXTREME OBESITY             III                >40                             Patient's  BMI Body mass index is 22.15 kg/(m^2).  http://hin.nhlbi.nih.gov/menuplanner/menu.cgi  Questioned patient about current smoking habits.  Pt. has never smoked.    The patient has verbalized that it is ok to leave a detailed voice message on the patient's home voicemail with results/recommendations from this visit.       Verified [phone] phone number:

## 2018-04-30 NOTE — PROGRESS NOTES
SUBJECTIVE:                                                    Jose David Zhang is a 72 year old male who presents to clinic today for the following health issues:      Hyperlipidemia Follow-Up      Rate your low fat/cholesterol diet?: good    Taking statin?  Yes, no muscle aches from statin    Other lipid medications/supplements?:  none    Hypertension Follow-up      Outpatient blood pressures are not being checked.    Low Salt Diet: no added salt    Vascular Disease Follow-up:  Coronary Artery Disease (CAD)      Chest pain or pressure, left side neck or arm pain: No    Shortness of breath/increased sweats/nausea with exertion: No    Pain in calves walking 1-2 blocks: No    Worsened or new symptoms since last visit: No    Nitroglycerin use: no    Daily aspirin use: Yes      Amount of exercise or physical activity: 2-3 days/week for an average of 15-30 minutes    Problems taking medications regularly: No    Medication side effects: none    Diet: regular (no restrictions)            Problem list and histories reviewed & adjusted, as indicated.  Additional history: as documented    Patient Active Problem List   Diagnosis     Mixed hyperlipidemia     BENIGN CORDELIA CEREBR MENINGES/meningioma     Health Care Home     Acromioclavicular joint arthritis     ACP (advance care planning)     Coronary artery disease involving autologous artery coronary bypass graft without angina pectoris     Nonintractable epilepsy without status epilepticus, unspecified epilepsy type (H)     Benign essential hypertension     Traumatic brain injury with loss of consciousness (H)     Past Surgical History:   Procedure Laterality Date     C CABG, ARTERIAL, FOUR+  10/9/01    FSD     EYE EXAM ESTABLISHED PT  2003     HC COLONOSCOPY THRU STOMA, DIAGNOSTIC  11/01/2016    Patient Reported     HCL PSA, DIAGNOSTIC (TUMOR MARKER)  2001     SIGMOIDOSCOPY,DIAGNOSTIC  1994    [facility]       Social History   Substance Use Topics     Smoking status: Never Smoker      Smokeless tobacco: Never Used     Alcohol use 1.5 oz/week     3 drink(s) per week     Family History   Problem Relation Age of Onset     CANCER No family hx of      DIABETES No family hx of      HEART DISEASE Father          Current Outpatient Prescriptions   Medication Sig Dispense Refill     ASPIRIN 81 MG OR TABS one daily 100 0     atenolol (TENORMIN) 25 MG tablet Take 1 tablet (25 mg) by mouth daily 90 tablet 1     atorvastatin (LIPITOR) 40 MG tablet Take 1 tablet (40 mg) by mouth daily 90 tablet 1     Multiple Vitamin (MULTI VITAMIN MENS) TABS Take 1 tablet by mouth daily.       [DISCONTINUED] atenolol (TENORMIN) 25 MG tablet Take 1 tablet (25 mg) by mouth daily 90 tablet 1     [DISCONTINUED] atorvastatin (LIPITOR) 40 MG tablet Take 1 tablet (40 mg) by mouth daily 90 tablet 1     Allergies   Allergen Reactions     No Known Allergies      Recent Labs   Lab Test  10/12/17   1040  04/11/17   0939  10/13/16   1735   LDL  57  91  102   HDL  47  44  44   TRIG  90  78  98   ALT  27  17  13   CR  1.24*  1.11  1.18   GFRESTIMATED  59*  67  63   POTASSIUM  5.1  5.3  4.9      BP Readings from Last 3 Encounters:   04/30/18 124/84   12/19/17 128/80   10/12/17 114/76    Wt Readings from Last 3 Encounters:   04/30/18 101.6 kg (224 lb)   12/19/17 97.5 kg (215 lb)   10/12/17 93.9 kg (207 lb)                    ROS:  Constitutional, HEENT, cardiovascular, pulmonary, gi and gu systems are negative, except as otherwise noted.    OBJECTIVE:     /84 (BP Location: Right arm, Patient Position: Chair, Cuff Size: Adult Large)  Pulse 66  Temp 98  F (36.7  C) (Oral)  Wt 101.6 kg (224 lb)  SpO2 98%  BMI 31.24 kg/m2  Body mass index is 31.24 kg/(m^2).   GENERAL: healthy, alert and no distress  EYES: Eyes grossly normal to inspection, PERRL and conjunctivae and sclerae normal  HENT: ear canals and TM's normal, nose and mouth without ulcers or lesions  NECK: no adenopathy, no asymmetry, masses, or scars and thyroid normal to  "palpation  RESP: lungs clear to auscultation - no rales, rhonchi or wheezes  CV: regular rate and rhythm, normal S1 S2, no S3 or S4, no murmur, click or rub, no peripheral edema and peripheral pulses strong  ABDOMEN: soft, nontender, no hepatosplenomegaly, no masses and bowel sounds normal  MS: no gross musculoskeletal defects noted, no edema  SKIN: no suspicious lesions or rashes  NEURO: Normal strength and tone, mentation intact and speech normal  PSYCH: mentation appears normal, affect normal/bright        ASSESSMENT:       PLAN:   (I25.810) Coronary artery disease involving autologous artery coronary bypass graft without angina pectoris  Comment: controlled  Plan: atenolol (TENORMIN) 25 MG tablet, atorvastatin         (LIPITOR) 40 MG tablet        continue current medications at current doses     (I10) Benign essential hypertension  Comment: well controlled  Plan: atenolol (TENORMIN) 25 MG tablet, COMPREHENSIVE        METABOLIC PANEL (QUEST) XCMP, VENOUS COLLECTION        continue current medications at current doses     (E78.2) Mixed hyperlipidemia  Comment: control uncertain  Plan: atorvastatin (LIPITOR) 40 MG tablet, Lipid         Profile (QUEST), COMPREHENSIVE METABOLIC PANEL         (QUEST) XCMP, VENOUS COLLECTION        continue current medications at current doses pending labs      BMI:   Estimated body mass index is 31.24 kg/(m^2) as calculated from the following:    Height as of 12/19/17: 1.803 m (5' 11\").    Weight as of this encounter: 101.6 kg (224 lb).   Weight management plan: Discussed healthy diet and exercise guidelines and patient will follow up in 6 months in clinic to re-evaluate.      FUTURE APPOINTMENTS:       - Follow-up visit in 6 mo  Work on weight loss  Regular exercise    Jose David Griffin MD  [facility]      "

## 2018-05-01 LAB
ALBUMIN SERPL-MCNC: 4.2 G/DL (ref 3.6–5.1)
ALBUMIN/GLOB SERPL: 1.4 (CALC) (ref 1–2.5)
ALP SERPL-CCNC: 91 U/L (ref 40–115)
ALT SERPL-CCNC: 14 U/L (ref 9–46)
AST SERPL-CCNC: 19 U/L (ref 10–35)
BILIRUB SERPL-MCNC: 0.5 MG/DL (ref 0.2–1.2)
BUN SERPL-MCNC: 21 MG/DL (ref 7–25)
BUN/CREATININE RATIO: NORMAL (CALC) (ref 6–22)
CALCIUM SERPL-MCNC: 9.1 MG/DL (ref 8.6–10.3)
CHLORIDE SERPLBLD-SCNC: 108 MMOL/L (ref 98–110)
CHOLEST SERPL-MCNC: 150 MG/DL
CHOLEST/HDLC SERPL: 3.7 (CALC)
CO2 SERPL-SCNC: 25 MMOL/L (ref 20–31)
CREAT SERPL-MCNC: 1.09 MG/DL (ref 0.7–1.18)
EGFR AFRICAN AMERICAN - QUEST: 79 ML/MIN/1.73M2
GFR SERPL CREATININE-BSD FRML MDRD: 68 ML/MIN/1.73M2
GLOBULIN, CALCULATED - QUEST: 3.1 G/DL (CALC) (ref 1.9–3.7)
GLUCOSE - QUEST: 94 MG/DL (ref 65–99)
HDLC SERPL-MCNC: 41 MG/DL
LDLC SERPL CALC-MCNC: 88 MG/DL (CALC)
NONHDLC SERPL-MCNC: 109 MG/DL (CALC)
POTASSIUM SERPL-SCNC: 4.9 MMOL/L (ref 3.5–5.3)
PROT SERPL-MCNC: 7.3 G/DL (ref 6.1–8.1)
SODIUM SERPL-SCNC: 141 MMOL/L (ref 135–146)
TRIGL SERPL-MCNC: 111 MG/DL

## 2018-07-10 ENCOUNTER — TRANSFERRED RECORDS (OUTPATIENT)
Dept: FAMILY MEDICINE | Facility: CLINIC | Age: 73
End: 2018-07-10

## 2018-07-10 ENCOUNTER — OFFICE VISIT (OUTPATIENT)
Dept: FAMILY MEDICINE | Facility: CLINIC | Age: 73
End: 2018-07-10

## 2018-07-10 VITALS — DIASTOLIC BLOOD PRESSURE: 76 MMHG | SYSTOLIC BLOOD PRESSURE: 138 MMHG | TEMPERATURE: 98.4 F | RESPIRATION RATE: 20 BRPM

## 2018-07-10 DIAGNOSIS — M25.512 BILATERAL SHOULDER PAIN, UNSPECIFIED CHRONICITY: ICD-10-CM

## 2018-07-10 DIAGNOSIS — M25.511 BILATERAL SHOULDER PAIN, UNSPECIFIED CHRONICITY: ICD-10-CM

## 2018-07-10 DIAGNOSIS — R07.89 RIGHT-SIDED CHEST WALL PAIN: Primary | ICD-10-CM

## 2018-07-10 PROCEDURE — 99213 OFFICE O/P EST LOW 20 MIN: CPT | Performed by: FAMILY MEDICINE

## 2018-07-10 PROCEDURE — 71101 X-RAY EXAM UNILAT RIBS/CHEST: CPT | Performed by: FAMILY MEDICINE
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
## 2018-07-10 NOTE — MR AVS SNAPSHOT
After Visit Summary   7/10/2018    Jose David Zhang    MRN: [id number]           Patient Information     Date Of Birth          1945        Visit Information        Provider Department      7/10/2018 11:15 AM Jose David Griffin MD [facility]        Today's Diagnoses     Right-sided chest wall pain    -  1    Bilateral shoulder pain, unspecified chronicity           Follow-ups after your visit        Additional Services     ORTHOPEDICS ADULT REFERRAL       Your provider has referred you to: [facility] [place] [phone]   [email or web address]    Please be aware that coverage of these services is subject to the terms and limitations of your health insurance plan.  Call member services at your health plan with any benefit or coverage questions.      Please bring the following to your appointment:    >>   Any x-rays, CTs or MRIs which have been performed.  Contact the facility where they were done to arrange for  prior to your scheduled appointment.    >>   List of current medications   >>   This referral request   >>   Any documents/labs given to you for this referral                  Who to contact     If you have questions or need follow up information about today's clinic visit or your schedule please contact [facility] directly at [phone].  Normal or non-critical lab and imaging results will be communicated to you by MyChart, letter or phone within 4 business days after the clinic has received the results. If you do not hear from us within 7 days, please contact the clinic through MyChart or phone. If you have a critical or abnormal lab result, we will notify you by phone as soon as possible.  Submit refill requests through [employer] or call your pharmacy and they will forward the refill request to us. Please allow 3 business days for your refill to be completed.          Additional  Information About Your Visit        MyChart Information     iPling gives you secure access to your electronic health record. If you see a primary care provider, you can also send messages to your care team and make appointments. If you have questions, please call your primary care clinic.  If you do not have a primary care provider, please call [phone] and they will assist you.        Care EveryWhere ID     This is your Care EveryWhere ID. This could be used by other organizations to access your [place] medical records  [id number]        Your Vitals Were     Temperature Respirations                98.4  F (36.9  C) (Oral) 20           Blood Pressure from Last 3 Encounters:   07/10/18 138/76   04/30/18 124/84   12/19/17 128/80    Weight from Last 3 Encounters:   04/30/18 101.6 kg (224 lb)   12/19/17 97.5 kg (215 lb)   10/12/17 93.9 kg (207 lb)              We Performed the Following     ORTHOPEDICS ADULT REFERRAL     X-ray rt rib unilateral 2 view     XR Ribs & Chest Rt 3vw        Primary Care Provider Office Phone # Fax #    Jose David Kulwant Griffin [phone]       [address] [facility] [address]  [facility] [address]        Equal Access to Services     TAMARA GRIJALVA : Hadii aad ku hadasho Soomaali, waaxda luqadaha, qaybta kaalmada adeegyada, waxay idiin hayaan adeeg kharamaxwell laalejandro . So [facility] [phone].    ATENCIÓN: Si habla español, tiene a hernandez disposición servicios gratuitos de asistencia lingüística. Llame al [phone].    We comply with applicable federal civil rights laws and [place] laws. We do not discriminate on the basis of race, color, national origin, age, disability, sex, sexual orientation, or gender identity.            Thank you!     Thank you for choosing [facility]  for your care. Our goal is always to provide you with excellent care. Hearing back from our patients is one way we can continue to improve our services. Please take a few minutes to  complete the written survey that you may receive in the mail after your visit with us. Thank you!             Your Updated Medication List - Protect others around you: Learn how to safely use, store and throw away your medicines at www.disposemymeds.org.          This list is accurate as of 7/10/18 12:25 PM.  Always use your most recent med list.                   Brand Name Dispense Instructions for use Diagnosis    aspirin 81 MG tablet     100    one daily    Mixed hyperlipidemia       atenolol 25 MG tablet    TENORMIN    90 tablet    Take 1 tablet (25 mg) by mouth daily    Coronary artery disease involving autologous artery coronary bypass graft without angina pectoris, Benign essential hypertension       atorvastatin 40 MG tablet    LIPITOR    90 tablet    Take 1 tablet (40 mg) by mouth daily    Mixed hyperlipidemia, Coronary artery disease involving autologous artery coronary bypass graft without angina pectoris       MULTI vitamin  MENS Tabs      Take 1 tablet by mouth daily.

## 2018-10-15 DIAGNOSIS — I10 BENIGN ESSENTIAL HYPERTENSION: ICD-10-CM

## 2018-10-15 DIAGNOSIS — I25.810 CORONARY ARTERY DISEASE INVOLVING AUTOLOGOUS ARTERY CORONARY BYPASS GRAFT WITHOUT ANGINA PECTORIS: ICD-10-CM

## 2018-10-15 DIAGNOSIS — E78.2 MIXED HYPERLIPIDEMIA: ICD-10-CM

## 2018-10-15 RX ORDER — ATORVASTATIN CALCIUM 40 MG/1
40 TABLET, FILM COATED ORAL DAILY
Qty: 30 TABLET | Refills: 0 | COMMUNITY
Start: 2018-10-15 | End: 2018-10-29

## 2018-10-15 RX ORDER — ATENOLOL 25 MG/1
25 TABLET ORAL DAILY
Qty: 30 TABLET | Refills: 0 | COMMUNITY
Start: 2018-10-15 | End: 2018-10-29

## 2018-10-15 NOTE — TELEPHONE ENCOUNTER
Patient called in and left a message asking for a short term refill of his atenolol medication to get him through until his scheduled appointment. I called in a 30 day supply of atorvastatin and atenolol due to both running out. Patient has an upcoming appt on 10/26/18 so he is all set for office visit. He was called back and informed of this.

## 2018-10-29 ENCOUNTER — OFFICE VISIT (OUTPATIENT)
Dept: FAMILY MEDICINE | Facility: CLINIC | Age: 73
End: 2018-10-29

## 2018-10-29 VITALS
TEMPERATURE: 98.5 F | BODY MASS INDEX: 29.6 KG/M2 | HEART RATE: 52 BPM | SYSTOLIC BLOOD PRESSURE: 156 MMHG | DIASTOLIC BLOOD PRESSURE: 74 MMHG | OXYGEN SATURATION: 98 % | WEIGHT: 212.2 LBS

## 2018-10-29 DIAGNOSIS — S06.9X9S TRAUMATIC BRAIN INJURY WITH LOSS OF CONSCIOUSNESS, SEQUELA (H): ICD-10-CM

## 2018-10-29 DIAGNOSIS — I10 BENIGN ESSENTIAL HYPERTENSION: ICD-10-CM

## 2018-10-29 DIAGNOSIS — I25.810 CORONARY ARTERY DISEASE INVOLVING AUTOLOGOUS ARTERY CORONARY BYPASS GRAFT WITHOUT ANGINA PECTORIS: ICD-10-CM

## 2018-10-29 DIAGNOSIS — E78.2 MIXED HYPERLIPIDEMIA: ICD-10-CM

## 2018-10-29 DIAGNOSIS — L98.9 SKIN LESION: Primary | ICD-10-CM

## 2018-10-29 PROCEDURE — 36415 COLL VENOUS BLD VENIPUNCTURE: CPT | Performed by: FAMILY MEDICINE

## 2018-10-29 PROCEDURE — 99214 OFFICE O/P EST MOD 30 MIN: CPT | Performed by: FAMILY MEDICINE

## 2018-10-29 RX ORDER — ATORVASTATIN CALCIUM 40 MG/1
40 TABLET, FILM COATED ORAL DAILY
Qty: 90 TABLET | Refills: 1 | Status: SHIPPED | OUTPATIENT
Start: 2018-10-29 | End: 2019-04-23

## 2018-10-29 RX ORDER — ATENOLOL 25 MG/1
25 TABLET ORAL DAILY
Qty: 90 TABLET | Refills: 1 | Status: SHIPPED | OUTPATIENT
Start: 2018-10-29 | End: 2019-04-23

## 2018-10-29 NOTE — LETTER
[facility]  [facility] Partner of [facility]  [facility] [address]  [place]  [address]    October 29, 2018        RE: Jose David Zhang  [address] [place] [address]              To Whom It May Concern,     Mr. Zhang is followed at this clinic for his primary care needs.  He suffers from post traumatic stress disorder related to his  service.  He finds that [facility] increases his stress levels as it brings up traumatic memories. For this reason he believes it healthier that he seek care outside of the [facility].      Please feel free to contact our office if you have further questions.         Jose David Griffin MD

## 2018-10-29 NOTE — NURSING NOTE
Jose David is here for med check - wants to discuss the [facility] and prescription refills    Pre-visit Screening:  Immunizations:  up to date  Colonoscopy:  is up to date  Mammogram: NA  Asthma Action Test/Plan:  NA  PHQ9:  None  GAD7:  None  Questioned patient about current smoking habits Pt. quit smoking some time ago.  Ok to leave detailed message on voice mail for today's visit only No, phone # No answering machine
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
## 2018-10-29 NOTE — MR AVS SNAPSHOT
After Visit Summary   10/29/2018    Jose David Zhang    MRN: [id number]           Patient Information     Date Of Birth          1945        Visit Information        Provider Department      10/29/2018 10:00 AM Jose David Griffin MD [facility]        Today's Diagnoses     Skin lesion    -  1    Coronary artery disease involving autologous artery coronary bypass graft without angina pectoris        Benign essential hypertension        Mixed hyperlipidemia        Traumatic brain injury with loss of consciousness, sequela (H)           Follow-ups after your visit        Additional Services     DERMATOLOGY REFERRAL       Your provider has referred you to: PREFERRED PROVIDERS:    Please be aware that coverage of these services is subject to the terms and limitations of your health insurance plan.  Call member services at your health plan with any benefit or coverage questions.      Please bring the following with you to your appointment:    (1) Any X-Rays, CTs or MRIs which have been performed.  Contact the facility where they were done to arrange for  prior to your scheduled appointment.    (2) List of current medications  (3) This referral request   (4) Any documents/labs given to you for this referral                  Follow-up notes from your care team     Return in about 6 months (around 4/29/2019).      Who to contact     If you have questions or need follow up information about today's clinic visit or your schedule please contact [facility] directly at [phone].  Normal or non-critical lab and imaging results will be communicated to you by MyChart, letter or phone within 4 business days after the clinic has received the results. If you do not hear from us within 7 days, please contact the clinic through MyChart or phone. If you have a critical or abnormal lab result, we will notify you by phone as soon as possible.  Submit refill  requests through [employer] or call your pharmacy and they will forward the refill request to us. Please allow 3 business days for your refill to be completed.          Additional Information About Your Visit        Viralizehart Information     [employer] gives you secure access to your electronic health record. If you see a primary care provider, you can also send messages to your care team and make appointments. If you have questions, please call your primary care clinic.  If you do not have a primary care provider, please call [phone] and they will assist you.        Care EveryWhere ID     This is your Care EveryWhere ID. This could be used by other organizations to access your [place] medical records  [id number]        Your Vitals Were     Pulse Temperature Pulse Oximetry BMI (Body Mass Index)          52 98.5  F (36.9  C) (Oral) 98% 29.6 kg/m2         Blood Pressure from Last 3 Encounters:   10/29/18 156/74   07/10/18 138/76   04/30/18 124/84    Weight from Last 3 Encounters:   10/29/18 96.3 kg (212 lb 3.2 oz)   04/30/18 101.6 kg (224 lb)   12/19/17 97.5 kg (215 lb)              We Performed the Following     COMPREHENSIVE METABOLIC PANEL ([facility]) XCMP     DERMATOLOGY REFERRAL     Lipid Profile ([facility])     VENOUS COLLECTION          Where to get your medicines      [facility] [address] [place] [address] [address] AT [place] [address] [address]  [address], [facility] [address]     Phone:  [phone]     atenolol 25 MG tablet    atorvastatin 40 MG tablet          Primary Care Provider Office Phone # Fax #    Jose David Griffin [phone] [address]  [facility] [address]        Equal Access to Services     [facility] VALENTINE : Olive Onofre, waaxda lunae, qaybta kaalmajadon watt, george barnes. So [facility] [phone].    ATENCIÓN: Si olman laguerre hernandez  disposición servicios gratuitos de asistencia lingüística. Dewey saldana [phone].    We comply with applicable federal civil rights laws and [place] laws. We do not discriminate on the basis of race, color, national origin, age, disability, sex, sexual orientation, or gender identity.            Thank you!     Thank you for choosing [facility]  for your care. Our goal is always to provide you with excellent care. Hearing back from our patients is one way we can continue to improve our services. Please take a few minutes to complete the written survey that you may receive in the mail after your visit with us. Thank you!             Your Updated Medication List - Protect others around you: Learn how to safely use, store and throw away your medicines at www.disposemymeds.org.          This list is accurate as of 10/29/18  1:08 PM.  Always use your most recent med list.                   Brand Name Dispense Instructions for use Diagnosis    aspirin 81 MG tablet     100    one daily    Mixed hyperlipidemia       atenolol 25 MG tablet    TENORMIN    90 tablet    Take 1 tablet (25 mg) by mouth daily    Coronary artery disease involving autologous artery coronary bypass graft without angina pectoris, Benign essential hypertension       atorvastatin 40 MG tablet    LIPITOR    90 tablet    Take 1 tablet (40 mg) by mouth daily    Mixed hyperlipidemia, Coronary artery disease involving autologous artery coronary bypass graft without angina pectoris       MULTI vitamin  MENS Tabs      Take 1 tablet by mouth daily.

## 2018-10-30 LAB
ALBUMIN SERPL-MCNC: 4.3 G/DL (ref 3.6–5.1)
ALBUMIN/GLOB SERPL: 1.3 (CALC) (ref 1–2.5)
ALP SERPL-CCNC: 86 U/L (ref 40–115)
ALT SERPL-CCNC: 16 U/L (ref 9–46)
AST SERPL-CCNC: 21 U/L (ref 10–35)
BILIRUB SERPL-MCNC: 0.6 MG/DL (ref 0.2–1.2)
BUN SERPL-MCNC: 17 MG/DL (ref 7–25)
BUN/CREATININE RATIO: NORMAL (CALC) (ref 6–22)
CALCIUM SERPL-MCNC: 9.4 MG/DL (ref 8.6–10.3)
CHLORIDE SERPLBLD-SCNC: 109 MMOL/L (ref 98–110)
CHOLEST SERPL-MCNC: 183 MG/DL
CHOLEST/HDLC SERPL: 3.6 (CALC)
CO2 SERPL-SCNC: 23 MMOL/L (ref 20–32)
CREAT SERPL-MCNC: 1.16 MG/DL (ref 0.7–1.18)
EGFR AFRICAN AMERICAN - QUEST: 73 ML/MIN/1.73M2
GFR SERPL CREATININE-BSD FRML MDRD: 63 ML/MIN/1.73M2
GLOBULIN, CALCULATED - QUEST: 3.2 G/DL (CALC) (ref 1.9–3.7)
GLUCOSE - QUEST: 94 MG/DL (ref 65–99)
HDLC SERPL-MCNC: 51 MG/DL
LDLC SERPL CALC-MCNC: 106 MG/DL (CALC)
NONHDLC SERPL-MCNC: 132 MG/DL (CALC)
POTASSIUM SERPL-SCNC: 4.6 MMOL/L (ref 3.5–5.3)
PROT SERPL-MCNC: 7.5 G/DL (ref 6.1–8.1)
SODIUM SERPL-SCNC: 141 MMOL/L (ref 135–146)
TRIGL SERPL-MCNC: 148 MG/DL

## 2018-12-14 ENCOUNTER — OFFICE VISIT (OUTPATIENT)
Dept: FAMILY MEDICINE | Facility: CLINIC | Age: 73
End: 2018-12-14

## 2018-12-14 VITALS
HEIGHT: 71 IN | DIASTOLIC BLOOD PRESSURE: 68 MMHG | HEART RATE: 62 BPM | OXYGEN SATURATION: 98 % | RESPIRATION RATE: 20 BRPM | BODY MASS INDEX: 29.26 KG/M2 | WEIGHT: 209 LBS | SYSTOLIC BLOOD PRESSURE: 124 MMHG | TEMPERATURE: 98 F

## 2018-12-14 DIAGNOSIS — Z48.01 ENCOUNTER FOR CHANGE OR REMOVAL OF SURGICAL WOUND DRESSING: ICD-10-CM

## 2018-12-14 DIAGNOSIS — Z98.890 H/O MOH'S MICROGRAPHIC SURGERY FOR SKIN CANCER: Primary | ICD-10-CM

## 2018-12-14 DIAGNOSIS — Z85.828 H/O MOH'S MICROGRAPHIC SURGERY FOR SKIN CANCER: Primary | ICD-10-CM

## 2018-12-14 PROCEDURE — 99213 OFFICE O/P EST LOW 20 MIN: CPT | Performed by: FAMILY MEDICINE

## 2018-12-14 ASSESSMENT — MIFFLIN-ST. JEOR: SCORE: 1725.15

## 2018-12-14 NOTE — PROGRESS NOTES
SUBJECTIVE: 73 year old male complaining of nose dressing issues after having a left nares basal cell carcionma removed by DR Wiggins and then follow up dressing and visit with DR Garibay, plastic surgery  for 1 day(s).   The patient describes always struggles with paper tape not sticking on his skin. Steri strips applied by surgery with follow up visit in 3 days. Episodic bleeding noted from the left nares with Kleenex inserted.   The patient denies a history of pain, swelling, discharge or fever.   Smoking history: No.   Relevant past medical history: positive for CABG on cholesterol and blood pressure medications.     ROS: 10 point ROS neg other than the symptoms noted above in the HPI.  Current Outpatient Medications   Medication     ASPIRIN 81 MG OR TABS     atenolol (TENORMIN) 25 MG tablet     atorvastatin (LIPITOR) 40 MG tablet     Multiple Vitamin (MULTI VITAMIN MENS) TABS     No current facility-administered medications for this visit.          OBJECTIVE: The patient appears healthy, alert, no distress, cooperative, smiling and a large bandage over his nose.   EARS: negative  NOSE/SINUS: positive findings: Large nares defect with steri strips and gelatinous gauze applied. Area cleaned. Telfa nonstick dressing applied. Bacitracin ointment to left nares/ Band Aids used for adhesion   THROAT: normal   NECK:Neck supple. No adenopathy. Thyroid symmetric, normal size,, Carotids without bruits.   CHEST: Clear    ASSESSMENT: (Z85.828,  Z98.890) H/O Moh's micrographic surgery for skin cancer  (primary encounter diagnosis)  Comment: apply bacitracin ointment bid to left nares  Plan: Monitor/ back to plastic surgeon in 3 days    (Z48.01) Encounter for change or removal of surgical wound dressing  Plan: as above.

## 2018-12-14 NOTE — NURSING NOTE
Jam is here for a f/u on nose surgery            Pre-visit Screening:  Immunizations:  up to date  Colonoscopy:  is up to date  Mammogram: NA  Asthma Action Test/Plan:  NA  PHQ9:  none  GAD7:  none  Questioned patient about current smoking habits Pt. has never smoked.  Ok to leave detailed message on voice mail for today's visit only -- patient does not have voicemail

## 2018-12-14 NOTE — PATIENT INSTRUCTIONS
H/O Moh's micrographic surgery for skin cancer  (primary encounter diagnosis)  Comment: apply bacitracin ointment bid to left nares  Plan: Monitor/ back to plastic surgeon in 3 days

## 2018-12-20 ENCOUNTER — OFFICE VISIT (OUTPATIENT)
Dept: FAMILY MEDICINE | Facility: CLINIC | Age: 73
End: 2018-12-20

## 2018-12-20 VITALS
HEIGHT: 71 IN | TEMPERATURE: 98.2 F | BODY MASS INDEX: 29.26 KG/M2 | SYSTOLIC BLOOD PRESSURE: 126 MMHG | HEART RATE: 61 BPM | DIASTOLIC BLOOD PRESSURE: 72 MMHG | OXYGEN SATURATION: 99 % | WEIGHT: 209 LBS

## 2018-12-20 DIAGNOSIS — G44.209 TENSION HEADACHE: Primary | ICD-10-CM

## 2018-12-20 PROCEDURE — 99213 OFFICE O/P EST LOW 20 MIN: CPT | Performed by: FAMILY MEDICINE

## 2018-12-20 ASSESSMENT — MIFFLIN-ST. JEOR: SCORE: 1725.15

## 2018-12-20 NOTE — PROGRESS NOTES
"  SUBJECTIVE:                                                    Jose David Zhang is a 73 year old male who presents to clinic today for the following health issues:      Headache  Onset: yesterday when daughter bumped heads with him last night    Description:   Location: bilateral in the frontal area, bilateral in the temporal area, bilateral in the occipital area   Character: dull pain  Frequency:  Mild on and off  Duration:  Since last night-did not affect sleep    Intensity: mild    Progression of Symptoms:  waxing and waning    Accompanying Signs & Symptoms:  Stiff neck: no  Neck or upper back pain: no  Fever: no  Sinus pressure: no  Nausea or vomiting: no  Dizziness: no  Numbness: no  Weakness: no  Visual changes: no    History:   Head trauma: mild  Family history of migraines: no  Previous tests for headaches: no  Neurologist evaluations: no  Able to do daily activities: YES  Wake with a headaches: no  Do headaches wake you up: no  Daily pain medication use: no  Work/school stressors/changes: no    Precipitating factors:   Does light make it worse: no  Does sound make it worse: no    Alleviating factors:  Does sleep help: unsure    Therapies Tried and outcome: none as wasn't sure what he can safely use given heart hx        Problem list and histories reviewed & adjusted, as indicated.  Additional history:         ROS:  Constitutional, HEENT, cardiovascular, pulmonary, gi and gu systems are negative, except as otherwise noted.    OBJECTIVE:     /72 (BP Location: Left arm, Patient Position: Sitting, Cuff Size: Adult Large)   Pulse 61   Temp 98.2  F (36.8  C) (Oral)   Ht 1.803 m (5' 11\")   Wt 94.8 kg (209 lb)   SpO2 99%   BMI 29.15 kg/m    Body mass index is 29.15 kg/m .   GENERAL: healthy, alert and no distress  NECK: no adenopathy, no asymmetry, masses, or scars and thyroid normal to palpation  RESP: lungs clear to auscultation - no rales, rhonchi or wheezes  CV: regular rate and rhythm, normal S1 " S2, no S3 or S4, no murmur, click or rub, no peripheral edema and peripheral pulses strong  ABDOMEN: soft, nontender, no hepatosplenomegaly, no masses and bowel sounds normal  MS: no gross musculoskeletal defects noted, no edema  NEURO: Normal strength and tone, mentation intact and speech normal    Diagnostic Test Results:  none     ASSESSMENT:       PLAN:   (G44.209) Tension headache  (primary encounter diagnosis)  Comment: no worrisome features, suspect mild tension-pt mainly interested in knowing what he can take  Plan: tylenol recommended, I reviewed the risks, benefits, and possible side effects of the medication.  The patient had an opportunity to ask any questions regarding the treatment plan. The patient was encouraged to call my office if any problems.            Jose David Griffin MD  [facility]

## 2018-12-20 NOTE — NURSING NOTE
Jam is here today to discuss headaches he is having.    Pre-visit Screening:  Immunizations:  up to date  Colonoscopy:  is up to date  Mammogram: NA  Asthma Action Test/Plan:  NICK  PHQ9:  NA  GAD7:  NA  Questioned patient about current smoking habits Pt. has never smoked.  Ok to leave detailed message on voice mail for today's visit only Yes, phone # [phone]

## 2018-12-29 ENCOUNTER — OFFICE VISIT (OUTPATIENT)
Dept: FAMILY MEDICINE | Facility: CLINIC | Age: 73
End: 2018-12-29

## 2018-12-29 VITALS
OXYGEN SATURATION: 98 % | HEIGHT: 71 IN | SYSTOLIC BLOOD PRESSURE: 114 MMHG | WEIGHT: 209 LBS | BODY MASS INDEX: 29.26 KG/M2 | DIASTOLIC BLOOD PRESSURE: 70 MMHG | TEMPERATURE: 98 F | HEART RATE: 78 BPM

## 2018-12-29 DIAGNOSIS — J01.90 ACUTE SINUSITIS WITH SYMPTOMS > 10 DAYS: Primary | ICD-10-CM

## 2018-12-29 PROCEDURE — 99213 OFFICE O/P EST LOW 20 MIN: CPT | Performed by: FAMILY MEDICINE

## 2018-12-29 RX ORDER — AMOXICILLIN 875 MG
875 TABLET ORAL 2 TIMES DAILY
Qty: 20 TABLET | Refills: 0 | Status: SHIPPED | OUTPATIENT
Start: 2018-12-29 | End: 2019-04-23

## 2018-12-29 ASSESSMENT — MIFFLIN-ST. JEOR: SCORE: 1717.21

## 2018-12-29 NOTE — NURSING NOTE
Jose David is here today for a sinus infection.    Pre-visit Screening:  Immunizations:  up to date  Colonoscopy:  is up to date  Mammogram: is up to date  Asthma Action Test/Plan:  NICK  PHQ9:  NA  GAD7:  NA  Questioned patient about current smoking habits Pt. has never smoked.  Ok to leave detailed message on voice mail for today's visit only No, phone # [phone], no voicemail set up, Rolot.

## 2018-12-29 NOTE — PROGRESS NOTES
"SUBJECTIVE:73 year old male presents with the following concern:    Thinks he may have a sinus infection  Seen for headache with Dr Griffin 12/20/2018      SUBJECTIVE:   Jose David Zhang is a 73 year old male who presents to clinic today for the following health issues:            Duration:  Helps care for grandchild who has been ill with a respiratory infection    Description :Clear nasal discharge- wakes up with it in the morning    Intensity:  mild    Accompanying signs and symptoms: \"sinus headache\"    No fever or chills    History (similar episodes/previous evaluation):history of sinus infections this time of year.    Precipitating or alleviating factors: None    Therapies tried and outcome: None       Medical problems:  Meningioma  Triple bipass  hypertension  Agent orange-  exposure  Recent excision of basal cell cancer, left side of his nose- secondary healing     Problem list and histories reviewed & adjusted, as indicated.  Additional history: as documented    Patient Active Problem List   Diagnosis     Mixed hyperlipidemia     BENIGN CORDELIA CEREBR MENINGES/meningioma     Health Care Home     Acromioclavicular joint arthritis     ACP (advance care planning)     Coronary artery disease involving autologous artery coronary bypass graft without angina pectoris     Nonintractable epilepsy without status epilepticus, unspecified epilepsy type (H)     Benign essential hypertension     Traumatic brain injury with loss of consciousness (H)     Past Surgical History:   Procedure Laterality Date     C CABG, ARTERIAL, FOUR+  10/9/01    FSD     EYE EXAM ESTABLISHED PT  2003     HC COLONOSCOPY THRU STOMA, DIAGNOSTIC  11/01/2016    Patient Reported     HCL PSA, DIAGNOSTIC (TUMOR MARKER)  2001     MOHS MICROGRAPHIC PROCEDURE  12/13/2018    DR Wiggins/ nose     SIGMOIDOSCOPY,DIAGNOSTIC  1994    [facility]       Social History     Tobacco Use     Smoking status: Never Smoker     Smokeless tobacco: Never Used   Substance Use Topics     " "Alcohol use: Yes     Alcohol/week: 1.5 oz     Types: 3 drink(s) per week     Family History   Problem Relation Age of Onset     Heart Disease Father      Cancer No family hx of      Diabetes No family hx of          Current Outpatient Medications   Medication Sig Dispense Refill     amoxicillin (AMOXIL) 875 MG tablet Take 1 tablet (875 mg) by mouth 2 times daily for 10 days 20 tablet 0     ASPIRIN 81 MG OR TABS one daily 100 0     atenolol (TENORMIN) 25 MG tablet Take 1 tablet (25 mg) by mouth daily 90 tablet 1     atorvastatin (LIPITOR) 40 MG tablet Take 1 tablet (40 mg) by mouth daily 90 tablet 1     Multiple Vitamin (MULTI VITAMIN MENS) TABS Take 1 tablet by mouth daily.         Reviewed and updated as needed this visit by clinical staff  Tobacco  Allergies  Meds  Problems       Reviewed and updated as needed this visit by Provider         ROS:  Constitutional, HEENT, cardiovascular, pulmonary, gi and gu systems are negative, except as otherwise noted.    OBJECTIVE:     /70 (BP Location: Right arm, Patient Position: Sitting, Cuff Size: Adult Large)   Pulse 78   Temp 98  F (36.7  C) (Oral)   Ht 1.791 m (5' 10.5\")   Wt 94.8 kg (209 lb)   SpO2 98%   BMI 29.56 kg/m    Body mass index is 29.56 kg/m .   No acute distress  External ears  and canals clear bilaterally. TM's normal bilaterally. Nose; band aid removed- left nasal defect from mohs surgery. Oropharynx normal. Neck supple without palpable adenopathy.  Chest is clear      Diagnostic Test Results:  none     ASSESSMENT/PLAN:     Problem List Items Addressed This Visit     None      Visit Diagnoses     Acute sinusitis with symptoms > 10 days    -  Primary    Relevant Medications    amoxicillin (AMOXIL) 875 MG tablet         Call or return to clinic prn if these symtoms worsen, fail to improve as anticipated, or if new symptoms develop.      Shi Almonte MD  [facility]    PLAN:  Using an allergy spray    "

## 2019-02-19 NOTE — NURSING NOTE
"Jose David Zhang is here today for an URI. Sx: Productive Cough, Nasal Congestion and Sinus Pressure x5 days.    Pre-visit Planning:    Immunizations -up to date  Colonoscopy -Colonoscopy Scheduled for February 2018.  Asthma test --NA  PHQ9 -NA  GRETA 7 -NA      Vitals:    BP Cuff left  Arm with large Cuff  PULSE regular  215 lbs 0 oz and 5' 11\"[Pt. Reported[  CLASSIFICATION OF OVERWEIGHT AND OBESITY BY BMI                        Obesity Class           BMI(kg/m2)  Underweight                                    < 18.5  Normal                                         18.5-24.9  Overweight                                     25.0-29.9  OBESITY                     I                  30.0-34.9                             II                 35.0-39.9  EXTREME OBESITY             III                >40      Patient's  BMI Body mass index is 29.99 kg/(m^2).  Http://hin.nhlbi.nih.gov/menuplanner/menu.cgi    My Chart: - accepts     Tobacco Use: Questioned patient about current smoking habits.  Pt. quit smoking some time ago.    The patient has verbalized that it is not ok to leave a detailed voice message on the patient's home voicemail and cell phone with results/recommendations from this visit.     Yessica Lara, [facility] [address]      " 0 = independent

## 2019-04-23 ENCOUNTER — OFFICE VISIT (OUTPATIENT)
Dept: FAMILY MEDICINE | Facility: CLINIC | Age: 74
End: 2019-04-23

## 2019-04-23 VITALS
WEIGHT: 221.6 LBS | BODY MASS INDEX: 31.02 KG/M2 | SYSTOLIC BLOOD PRESSURE: 108 MMHG | RESPIRATION RATE: 20 BRPM | HEART RATE: 48 BPM | DIASTOLIC BLOOD PRESSURE: 60 MMHG | HEIGHT: 71 IN | TEMPERATURE: 98.1 F

## 2019-04-23 DIAGNOSIS — E78.2 MIXED HYPERLIPIDEMIA: ICD-10-CM

## 2019-04-23 DIAGNOSIS — I10 BENIGN ESSENTIAL HYPERTENSION: ICD-10-CM

## 2019-04-23 DIAGNOSIS — I25.810 CORONARY ARTERY DISEASE INVOLVING AUTOLOGOUS ARTERY CORONARY BYPASS GRAFT WITHOUT ANGINA PECTORIS: ICD-10-CM

## 2019-04-23 PROCEDURE — 99214 OFFICE O/P EST MOD 30 MIN: CPT | Performed by: FAMILY MEDICINE

## 2019-04-23 PROCEDURE — 36415 COLL VENOUS BLD VENIPUNCTURE: CPT | Performed by: FAMILY MEDICINE

## 2019-04-23 RX ORDER — ATORVASTATIN CALCIUM 40 MG/1
40 TABLET, FILM COATED ORAL DAILY
Qty: 90 TABLET | Refills: 1 | Status: SHIPPED | OUTPATIENT
Start: 2019-04-23 | End: 2019-10-31

## 2019-04-23 RX ORDER — ATENOLOL 25 MG/1
25 TABLET ORAL DAILY
Qty: 90 TABLET | Refills: 1 | Status: SHIPPED | OUTPATIENT
Start: 2019-04-23 | End: 2019-10-31

## 2019-04-23 ASSESSMENT — MIFFLIN-ST. JEOR: SCORE: 1774.36

## 2019-04-23 NOTE — PROGRESS NOTES
SUBJECTIVE:   Jose David Zhang is a 73 year old male who presents to clinic today for the following   health issues:      Hyperlipidemia Follow-Up      Rate your low fat/cholesterol diet?: good    Taking statin?  Yes, no muscle aches from statin    Other lipid medications/supplements?:  none    Hypertension Follow-up      Outpatient blood pressures are not being checked.    Low Salt Diet: no added salt    Vascular Disease Follow-up:  Coronary Artery Disease (CAD)      Chest pain or pressure, left side neck or arm pain: No    Shortness of breath/increased sweats/nausea with exertion: No    Pain in calves walking 1-2 blocks: No    Worsened or new symptoms since last visit: No    Nitroglycerin use: no    Daily aspirin use: Yes      Amount of exercise or physical activity: 2-3 days/week for an average of 30-45 minutes    Problems taking medications regularly: No    Medication side effects: none    Diet: regular (no restrictions)        Pt had MOhs for BCC    Additional history: as documented    Reviewed  and updated as needed this visit by clinical staff  Tobacco         Reviewed and updated as needed this visit by Provider         Patient Active Problem List   Diagnosis     Mixed hyperlipidemia     BENIGN CORDELIA CEREBR MENINGES/meningioma     Health Care Home     Acromioclavicular joint arthritis     ACP (advance care planning)     Coronary artery disease involving autologous artery coronary bypass graft without angina pectoris     Nonintractable epilepsy without status epilepticus, unspecified epilepsy type (H)     Benign essential hypertension     Traumatic brain injury with loss of consciousness (H)     Past Surgical History:   Procedure Laterality Date     C CABG, ARTERIAL, FOUR+  10/9/01    FSD     EYE EXAM ESTABLISHED PT  2003     HC COLONOSCOPY THRU STOMA, DIAGNOSTIC  11/01/2016    Patient Reported     HCL PSA, DIAGNOSTIC (TUMOR MARKER)  2001     MOHS MICROGRAPHIC PROCEDURE  12/13/2018    DR Wiggins/ nose      "SIGMOIDOSCOPY,DIAGNOSTIC  1994    [facility]       Social History     Tobacco Use     Smoking status: Never Smoker     Smokeless tobacco: Never Used   Substance Use Topics     Alcohol use: Yes     Alcohol/week: 1.5 oz     Types: 3 drink(s) per week     Family History   Problem Relation Age of Onset     Heart Disease Father      Cancer No family hx of      Diabetes No family hx of          Current Outpatient Medications   Medication Sig Dispense Refill     ASPIRIN 81 MG OR TABS one daily 100 0     atenolol (TENORMIN) 25 MG tablet Take 1 tablet (25 mg) by mouth daily 90 tablet 1     atorvastatin (LIPITOR) 40 MG tablet Take 1 tablet (40 mg) by mouth daily 90 tablet 1     Multiple Vitamin (MULTI VITAMIN MENS) TABS Take 1 tablet by mouth daily.       Allergies   Allergen Reactions     No Known Allergies      Recent Labs   Lab Test 10/29/18  1035 04/30/18  0922 10/12/17  1040   * 88 57   HDL 51 41 47   TRIG 148 111 90   ALT 16 14 27   CR 1.16 1.09 1.24*   GFRESTIMATED 63 68 59*   POTASSIUM 4.6 4.9 5.1      BP Readings from Last 3 Encounters:   04/23/19 108/60   12/29/18 114/70   12/20/18 126/72    Wt Readings from Last 3 Encounters:   04/23/19 100.5 kg (221 lb 9.6 oz)   12/29/18 94.8 kg (209 lb)   12/20/18 94.8 kg (209 lb)                    ROS:  Constitutional, HEENT, cardiovascular, pulmonary, gi and gu systems are negative, except as otherwise noted.    OBJECTIVE:     /60 (BP Location: Left arm, Patient Position: Chair, Cuff Size: Adult Regular)   Pulse (!) 48   Temp 98.1  F (36.7  C) (Oral)   Resp 20   Ht 1.791 m (5' 10.5\")   Wt 100.5 kg (221 lb 9.6 oz)   BMI 31.35 kg/m    Body mass index is 31.35 kg/m .   GENERAL: healthy, alert and no distress  EYES: Eyes grossly normal to inspection, PERRL and conjunctivae and sclerae normal  HENT: ear canals and TM's normal, nose and mouth without ulcers or lesions  NECK: no adenopathy, no asymmetry, masses, or scars and thyroid normal to palpation  RESP: lungs clear " "to auscultation - no rales, rhonchi or wheezes  CV: regular rate and rhythm, normal S1 S2, no S3 or S4, no murmur, click or rub, no peripheral edema and peripheral pulses strong  ABDOMEN: soft, nontender, no hepatosplenomegaly, no masses and bowel sounds normal  MS: no gross musculoskeletal defects noted, no edema  SKIN: healing surgical site left nare  NEURO: Normal strength and tone, mentation intact and speech normal  PSYCH: mentation appears normal, affect normal/bright        ASSESSMENT:       PLAN:   (E78.2) Mixed hyperlipidemia  Comment: control uncertain  Plan: atorvastatin (LIPITOR) 40 MG tablet        continue current medications at current doses     (I25.810) Coronary artery disease involving autologous artery coronary bypass graft without angina pectoris  Comment: stable -no symptoms   Plan: atorvastatin (LIPITOR) 40 MG tablet, atenolol         (TENORMIN) 25 MG tablet        continue current medications at current doses     (I10) Benign essential hypertension  Comment: well controlled  Plan: atenolol (TENORMIN) 25 MG tablet        continue current medications at current doses       Tobacco Cessation:pt denies he smokes but smell of nicotine present   reports that he has never smoked. He has never used smokeless tobacco.  Tobacco Cessation Action Plan: Information offered: Patient not interested at this time    BMI:   Estimated body mass index is 31.35 kg/m  as calculated from the following:    Height as of this encounter: 1.791 m (5' 10.5\").    Weight as of this encounter: 100.5 kg (221 lb 9.6 oz).   Weight management plan: Discussed healthy diet and exercise guidelines      FUTURE APPOINTMENTS:       - Follow-up visit in 6 mo  Work on weight loss  Regular exercise    Jose David Griffin MD  [facility]      "

## 2019-04-23 NOTE — NURSING NOTE
Jose David Zhang is here for fasting blood work and medication refill.  Questioned patient about current smoking habits.  Pt. has never smoked.  Body mass index is 33.67 kg/(m^2).  PULSE regular  My Chart: active    Pre-visit planning  Immunizations - up to date  Colonoscopy - declines  Mammogram -   Asthma -   PHQ9  GRETA-7

## 2019-04-24 LAB
ALBUMIN SERPL-MCNC: 4.1 G/DL (ref 3.6–5.1)
ALBUMIN/GLOB SERPL: 1.5 (CALC) (ref 1–2.5)
ALP SERPL-CCNC: 83 U/L (ref 40–115)
ALT SERPL-CCNC: 14 U/L (ref 9–46)
AST SERPL-CCNC: 20 U/L (ref 10–35)
BILIRUB SERPL-MCNC: 0.5 MG/DL (ref 0.2–1.2)
BUN SERPL-MCNC: 19 MG/DL (ref 7–25)
BUN/CREATININE RATIO: NORMAL (CALC) (ref 6–22)
CALCIUM SERPL-MCNC: 9.3 MG/DL (ref 8.6–10.3)
CHLORIDE SERPLBLD-SCNC: 108 MMOL/L (ref 98–110)
CHOLEST SERPL-MCNC: 154 MG/DL
CHOLEST/HDLC SERPL: 3.5 (CALC)
CO2 SERPL-SCNC: 24 MMOL/L (ref 20–32)
CREAT SERPL-MCNC: 1.16 MG/DL (ref 0.7–1.18)
EGFR AFRICAN AMERICAN - QUEST: 73 ML/MIN/1.73M2
GFR SERPL CREATININE-BSD FRML MDRD: 63 ML/MIN/1.73M2
GLOBULIN, CALCULATED - QUEST: 2.7 G/DL (CALC) (ref 1.9–3.7)
GLUCOSE - QUEST: 91 MG/DL (ref 65–99)
HDLC SERPL-MCNC: 44 MG/DL
LDLC SERPL CALC-MCNC: 87 MG/DL (CALC)
NONHDLC SERPL-MCNC: 110 MG/DL (CALC)
POTASSIUM SERPL-SCNC: 5.1 MMOL/L (ref 3.5–5.3)
PROT SERPL-MCNC: 6.8 G/DL (ref 6.1–8.1)
SODIUM SERPL-SCNC: 141 MMOL/L (ref 135–146)
TRIGL SERPL-MCNC: 129 MG/DL

## 2019-06-04 ENCOUNTER — OFFICE VISIT (OUTPATIENT)
Dept: FAMILY MEDICINE | Facility: CLINIC | Age: 74
End: 2019-06-04

## 2019-06-04 VITALS
RESPIRATION RATE: 18 BRPM | OXYGEN SATURATION: 99 % | DIASTOLIC BLOOD PRESSURE: 68 MMHG | SYSTOLIC BLOOD PRESSURE: 130 MMHG | HEIGHT: 71 IN | BODY MASS INDEX: 29.96 KG/M2 | TEMPERATURE: 98 F | WEIGHT: 214 LBS | HEART RATE: 54 BPM

## 2019-06-04 DIAGNOSIS — Z91.89 RISK OF EXPOSURE TO LYME DISEASE: ICD-10-CM
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
## 2019-06-04 DIAGNOSIS — S10.96XA TICK BITE OF NECK, INITIAL ENCOUNTER: Primary | ICD-10-CM

## 2019-06-04 DIAGNOSIS — Z23 NEED FOR VACCINE FOR DT (DIPHTHERIA-TETANUS): ICD-10-CM

## 2019-06-04 PROCEDURE — 90714 TD VACC NO PRESV 7 YRS+ IM: CPT | Performed by: FAMILY MEDICINE

## 2019-06-04 PROCEDURE — 99213 OFFICE O/P EST LOW 20 MIN: CPT | Mod: 25 | Performed by: FAMILY MEDICINE

## 2019-06-04 PROCEDURE — 90471 IMMUNIZATION ADMIN: CPT | Performed by: FAMILY MEDICINE

## 2019-06-04 RX ORDER — DOXYCYCLINE HYCLATE 100 MG
200 TABLET ORAL DAILY
Qty: 2 TABLET | Refills: 0 | Status: SHIPPED | OUTPATIENT
Start: 2019-06-04 | End: 2019-11-07

## 2019-06-04 SDOH — ECONOMIC STABILITY: TRANSPORTATION INSECURITY
IN THE PAST 12 MONTHS, HAS THE LACK OF TRANSPORTATION KEPT YOU FROM MEDICAL APPOINTMENTS OR FROM GETTING MEDICATIONS?: NO

## 2019-06-04 SDOH — ECONOMIC STABILITY: FOOD INSECURITY: WITHIN THE PAST 12 MONTHS, THE FOOD YOU BOUGHT JUST DIDN'T LAST AND YOU DIDN'T HAVE MONEY TO GET MORE.: NEVER TRUE

## 2019-06-04 SDOH — ECONOMIC STABILITY: INCOME INSECURITY: HOW HARD IS IT FOR YOU TO PAY FOR THE VERY BASICS LIKE FOOD, HOUSING, MEDICAL CARE, AND HEATING?: NOT HARD AT ALL

## 2019-06-04 SDOH — ECONOMIC STABILITY: TRANSPORTATION INSECURITY
IN THE PAST 12 MONTHS, HAS LACK OF TRANSPORTATION KEPT YOU FROM MEETINGS, WORK, OR FROM GETTING THINGS NEEDED FOR DAILY LIVING?: NO

## 2019-06-04 SDOH — ECONOMIC STABILITY: FOOD INSECURITY: WITHIN THE PAST 12 MONTHS, YOU WORRIED THAT YOUR FOOD WOULD RUN OUT BEFORE YOU GOT MONEY TO BUY MORE.: NEVER TRUE

## 2019-06-04 ASSESSMENT — MIFFLIN-ST. JEOR: SCORE: 1739.89

## 2019-06-04 NOTE — PATIENT INSTRUCTIONS
Tick bite of neck, initial encounter  (primary encounter diagnosis)  Comment: read handout  Plan: doxycycline hyclate (VIBRA-TABS) 100 MG tablet        Prevention discussed and use of single dose antibiotic. Potential medication side effects were discussed with the patient; let me know if any occur.

## 2019-06-04 NOTE — PROGRESS NOTES
RASH    Location:right neck tick bite    When:this weekend and it might have been on for 5 days    any causative agent ?:tick bite    any other chronic skin diseases?: none    Description:Right neck line puncture wound with scab/ surrousning erythema without calor 2 cm    pt use of Meds:none    Diagnosis:(S10.96XA,  W57.XXXA) Tick bite of neck, initial encounter  (primary encounter diagnosis)  Comment: read handout  Plan: doxycycline hyclate (VIBRA-TABS) 100 MG tablet        Prevention discussed and use of single dose antibiotic. Potential medication side effects were discussed with the patient; let me know if any occur.      (Z91.89) Risk of exposure to Lyme disease  Plan: doxycycline hyclate (VIBRA-TABS) 100 MG tablet       Tetanus updated          plan for follow up: prn

## 2019-06-04 NOTE — NURSING NOTE
Jam is here for a tick bite on right side of his neck.          Pre-visit Screening:  Immunizations:  not up to date - TD shot today  Colonoscopy:  is up to date  Mammogram: NA  Asthma Action Test/Plan:  NA  PHQ9:  None  GAD7:  None  Questioned patient about current smoking habits Pt. Occasional cigar  Ok to leave detailed message on voice mail for today's visit only No

## 2019-07-11 ENCOUNTER — TRANSFERRED RECORDS (OUTPATIENT)
Dept: FAMILY MEDICINE | Facility: CLINIC | Age: 74
End: 2019-07-11

## 2019-08-19 ENCOUNTER — OFFICE VISIT (OUTPATIENT)
Dept: FAMILY MEDICINE | Facility: CLINIC | Age: 74
End: 2019-08-19

## 2019-08-19 VITALS
BODY MASS INDEX: 29.73 KG/M2 | HEART RATE: 51 BPM | SYSTOLIC BLOOD PRESSURE: 116 MMHG | TEMPERATURE: 98.4 F | DIASTOLIC BLOOD PRESSURE: 68 MMHG | WEIGHT: 210.2 LBS | OXYGEN SATURATION: 97 %

## 2019-08-19 DIAGNOSIS — A08.4 VIRAL GASTROENTERITIS: Primary | ICD-10-CM

## 2019-08-19 PROCEDURE — 99213 OFFICE O/P EST LOW 20 MIN: CPT | Performed by: FAMILY MEDICINE

## 2019-08-19 NOTE — NURSING NOTE
Jam is here today for a URI and not feeling well.    Pre-visit Screening:  Immunizations:  up to date  Colonoscopy:  is up to date  Mammogram: NA  Asthma Action Test/Plan:  NICK  PHQ9:  NA  GAD7:  NA  Questioned patient about current smoking habits Pt. has never smoked.  Ok to leave detailed message on voice mail for today's visit only No, phone # [phone], no voicemail set up.

## 2019-08-19 NOTE — PROGRESS NOTES
SUBJECTIVE:   Jose David Zhang is a 74 year old male who complains of diarrhea after meals, no blood in stool, headache, dry cough, myalgias and fatigue for 5 days. He denies a history of productive cough, sweats, wheezing, shortness of breath, nausea, vomiting and chest pain and denies a history of asthma. Patient does smoke cigarettes.    Pt has had diarrhea with most meals for last 3-4 days    Daughter with illness and bronchitis currently    Patient Active Problem List   Diagnosis     Mixed hyperlipidemia     BENIGN CORDELIA CEREBR MENINGES/meningioma     Health Care Home     Acromioclavicular joint arthritis     ACP (advance care planning)     Coronary artery disease involving autologous artery coronary bypass graft without angina pectoris     Nonintractable epilepsy without status epilepticus, unspecified epilepsy type (H)     Benign essential hypertension     Traumatic brain injury with loss of consciousness (H)     Past Medical History:   Diagnosis Date     Benign neoplasm of cerebral meninges (H) 2006    left sphenoid wing, dr kelly /meningioma     Chronic ischemic heart disease, unspecified      EPILEPSY NOS W/O MENTN INTRACTABLE/due to tumor 12/1/2006     Fam hx-cardiovas dis NEC      Mixed hyperlipidemia      Postsurgical 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/01     Family History   Problem Relation Age of Onset     Heart Disease Father      Cancer No family hx of      Diabetes No family hx of      Social History     Socioeconomic History     Marital status:      Spouse name: Lilian     Number of children: 2     Years of education: 16     Highest education level: Not on file   Occupational History     Occupation: Retired     Comment: Insurance, Banking   Social Needs     Financial resource strain: Not hard at all     Food insecurity:     Worry: Never true     Inability: Never true     Transportation needs:     Medical: No     Non-medical: No   Tobacco Use     Smoking status: Never Smoker     Smokeless tobacco:  Never Used     Tobacco comment: occasional cigar   Substance and Sexual Activity     Alcohol use: Yes     Alcohol/week: 1.5 oz     Types: 3 Standard drinks or equivalent per week     Drug use: No     Sexual activity: Never     Partners: Female   Lifestyle     Physical activity:     Days per week: Not on file     Minutes per session: Not on file     Stress: Not on file   Relationships     Social connections:     Talks on phone: Not on file     Gets together: Not on file     Attends [employer] service: Not on file     Active member of club or organization: Not on file     Attends meetings of clubs or organizations: Not on file     Relationship status: Not on file     Intimate partner violence:     Fear of current or ex partner: Not on file     Emotionally abused: Not on file     Physically abused: Not on file     Forced sexual activity: Not on file   Other Topics Concern      Service Not Asked     Blood Transfusions Not Asked     Caffeine Concern Not Asked     Occupational Exposure Not Asked     Hobby Hazards Not Asked     Sleep Concern Not Asked     Stress Concern Not Asked     Weight Concern Not Asked     Special Diet Not Asked     Back Care Not Asked     Exercise Yes     Bike Helmet Not Asked     Seat Belt Yes     Self-Exams Yes   Social History Narrative     Not on file     Past Surgical History:   Procedure Laterality Date     C CABG, ARTERIAL, FOUR+  10/9/01    FSD     EYE EXAM ESTABLISHED PT  2003     HC COLONOSCOPY THRU STOMA, DIAGNOSTIC  11/01/2016    Patient Reported     HCL PSA, DIAGNOSTIC (TUMOR MARKER)  2001     MOHS MICROGRAPHIC PROCEDURE  12/13/2018    DR Wiggins/ nose     SIGMOIDOSCOPY,DIAGNOSTIC  1994    [facility]       Current Outpatient Medications on File Prior to Visit:  ASPIRIN 81 MG OR TABS one daily   atenolol (TENORMIN) 25 MG tablet Take 1 tablet (25 mg) by mouth daily   atorvastatin (LIPITOR) 40 MG tablet Take 1 tablet (40 mg) by mouth daily   doxycycline hyclate (VIBRA-TABS) 100 MG tablet Take  2 tablets (200 mg) by mouth daily For a single dose   Multiple Vitamin (MULTI VITAMIN MENS) TABS Take 1 tablet by mouth daily.     No current facility-administered medications on file prior to visit.      Allergies: No known allergies    Immunization History   Administered Date(s) Administered     Influenza (High Dose) 3 valent vaccine 10/01/2014, 10/05/2016, 09/25/2017, 09/20/2018     Influenza (IIV3) PF 11/04/2008, 09/28/2011, 09/20/2012, 11/09/2013     Influenza Vaccine IM 3yrs+ 4 Valent IIV4 09/29/2015     Pneumo Conj 13-V (2010&after) 10/05/2016     Pneumococcal 23 valent 10/12/2017     TD (ADULT, 7+) 01/01/2001, 06/04/2019     TDAP Vaccine (Boostrix) 04/15/2009         OBJECTIVE:/68 (BP Location: Left arm, Patient Position: Sitting, Cuff Size: Adult Large)   Pulse 51   Temp 98.4  F (36.9  C) (Oral)   Wt 95.3 kg (210 lb 3.2 oz)   SpO2 97%   BMI 29.73 kg/m     He appears well, vital signs are as noted by the nurse. Ears normal.  Throat and pharynx normal.  Neck supple. No adenopathy in the neck. Nose is congested. Sinuses non tender. The chest is clear, without wheezes or rales.The abdomen is soft without tenderness, guarding, mass, rebound or organomegaly. Bowel sounds are normal. No CVA tenderness or inguinal adenopathy noted.     ASSESSMENT:   Viral syndrome    PLAN:  Symptomatic therapy suggested: push fluids and rest. Call or return to clinic prn if these symptoms worsen or fail to improve as anticipated.     Consider stool tests if not resolving in a week

## 2019-09-26 ENCOUNTER — OFFICE VISIT (OUTPATIENT)
Dept: FAMILY MEDICINE | Facility: CLINIC | Age: 74
End: 2019-09-26

## 2019-09-26 VITALS
WEIGHT: 213 LBS | BODY MASS INDEX: 30.13 KG/M2 | DIASTOLIC BLOOD PRESSURE: 82 MMHG | TEMPERATURE: 98.5 F | HEART RATE: 51 BPM | OXYGEN SATURATION: 98 % | SYSTOLIC BLOOD PRESSURE: 122 MMHG

## 2019-09-26 DIAGNOSIS — J01.90 ACUTE SINUSITIS WITH SYMPTOMS > 10 DAYS: Primary | ICD-10-CM

## 2019-09-26 PROCEDURE — 99213 OFFICE O/P EST LOW 20 MIN: CPT | Performed by: PHYSICIAN ASSISTANT

## 2019-09-26 RX ORDER — AMOXICILLIN 875 MG
875 TABLET ORAL 2 TIMES DAILY
Qty: 20 TABLET | Refills: 0 | Status: SHIPPED | OUTPATIENT
Start: 2019-09-26 | End: 2019-10-31

## 2019-09-26 NOTE — PROGRESS NOTES
SUBJECTIVE:                                                    Jose David Zhang is a 74 year old male who presents to clinic today for the following health issues:    Jam is here with concerns of sinus infection. Sx include facial pain.  He gets this about twice a year.  Usually in the fall. Thinks he may have seasonal allergies.  Sx began 7 days ago.     Heating vents cleaned on house.    Pt states when he goes to his cabin his sx go away.   He states his sx always go away with amoxicillin.  He uses Flonase daily    He denies fevers.           BP Readings from Last 3 Encounters:   09/26/19 122/82   08/19/19 116/68   06/04/19 130/68    Wt Readings from Last 3 Encounters:   09/26/19 96.6 kg (213 lb)   08/19/19 95.3 kg (210 lb 3.2 oz)   06/04/19 97.1 kg (214 lb)            Patient Active Problem List   Diagnosis     Mixed hyperlipidemia     BENIGN CORDELIA CEREBR MENINGES/meningioma     Health Care Home     Acromioclavicular joint arthritis     ACP (advance care planning)     Coronary artery disease involving autologous artery coronary bypass graft without angina pectoris     Nonintractable epilepsy without status epilepticus, unspecified epilepsy type (H)     Benign essential hypertension     Traumatic brain injury with loss of consciousness (H)     Past Surgical History:   Procedure Laterality Date     C CABG, ARTERIAL, FOUR+  10/9/01    FSD     EYE EXAM ESTABLISHED PT  2003     HC COLONOSCOPY THRU STOMA, DIAGNOSTIC  11/01/2016    Patient Reported     HCL PSA, DIAGNOSTIC (TUMOR MARKER)  2001     MOHS MICROGRAPHIC PROCEDURE  12/13/2018    DR Wiggins/ nose     SIGMOIDOSCOPY,DIAGNOSTIC  1994    [facility]       Social History     Tobacco Use     Smoking status: Never Smoker     Smokeless tobacco: Never Used     Tobacco comment: occasional cigar   Substance Use Topics     Alcohol use: Yes     Alcohol/week: 2.5 standard drinks     Types: 3 Standard drinks or equivalent per week     Family History   Problem Relation Age of Onset      Heart Disease Father      Cancer No family hx of      Diabetes No family hx of          Current Outpatient Medications   Medication Sig Dispense Refill     ASPIRIN 81 MG OR TABS one daily 100 0     atenolol (TENORMIN) 25 MG tablet Take 1 tablet (25 mg) by mouth daily 90 tablet 1     atorvastatin (LIPITOR) 40 MG tablet Take 1 tablet (40 mg) by mouth daily 90 tablet 1     doxycycline hyclate (VIBRA-TABS) 100 MG tablet Take 2 tablets (200 mg) by mouth daily For a single dose 2 tablet 0     Multiple Vitamin (MULTI VITAMIN MENS) TABS Take 1 tablet by mouth daily.         Allergies   Allergen Reactions     No Known Allergies        OBJECTIVE:                                                    /82 (BP Location: Right arm, Patient Position: Sitting, Cuff Size: Adult Large)   Pulse 51   Temp 98.5  F (36.9  C) (Oral)   Wt 96.6 kg (213 lb)   SpO2 98%   BMI 30.13 kg/m   Body mass index is 30.13 kg/m .     GENERAL: alert and no distress  HEAD: Normocephalic, atraumatic  EYES: Eyes grossly normal to inspection, extraocular movements - intact  EARS:   Right: External ear and canal normal, TM normal  Left: External ear and canal normal, TM normal  NOSE: No discharge noted  MOUTH/THROAT: no ulcers, no lesions, pharynx non-erythematous, no exudates present, tonsils without hypertrophy, mucous membranes moist.   NECK: no tenderness, no adenopathy, no asymmetry, no masses, no stiffness; thyroid- normal to palpation  RESP: lungs clear to auscultation - no crackles, no rhonchi, no wheezes  CV: regular rates and rhythm, normal S1 S2, no S3 or S4 and no murmur, no click or rub -         ASSESSMENT/PLAN:                                                      1. Acute sinusitis with symptoms > 10 days    - amoxicillin (AMOXIL) 875 MG tablet; Take 1 tablet (875 mg) by mouth 2 times daily  Dispense: 20 tablet; Refill: 0    I HIGHLY suspect allergies  Advised 5-7 days of Flonase + Zyrtec  If sx not improved then take amoxicillin  I  discussed the concern for abx resistance with overuse and antibiotic stewardship with the pt.    Chantale Davis PA-C  [facility]

## 2019-09-27 ENCOUNTER — HEALTH MAINTENANCE LETTER (OUTPATIENT)
Age: 74
End: 2019-09-27

## 2019-10-09 ENCOUNTER — TELEPHONE (OUTPATIENT)
Dept: FAMILY MEDICINE | Facility: CLINIC | Age: 74
End: 2019-10-09

## 2019-10-09 DIAGNOSIS — J01.90 ACUTE SINUSITIS WITH SYMPTOMS > 10 DAYS: Primary | ICD-10-CM

## 2019-10-09 NOTE — TELEPHONE ENCOUNTER
Spoke with pt, he has been taking Flonase and has taken Zyrtec. He just completed his Amoxicillin and felt great, the day immediately following finishing the prescription he felt all his symptoms coming back. Pt is wondering if he can get a 5 day supply of Amoxicillin sent in for him or possible something else. Please advise for CER.    Please advise # [phone]    Thanks, Anahi

## 2019-10-09 NOTE — TELEPHONE ENCOUNTER
Spoke with pt. Advised him that a new RX was sent in for him. Pt understood and had no further questions.

## 2019-10-26 ENCOUNTER — HEALTH MAINTENANCE LETTER (OUTPATIENT)
Age: 74
End: 2019-10-26

## 2019-10-31 ENCOUNTER — OFFICE VISIT (OUTPATIENT)
Dept: FAMILY MEDICINE | Facility: CLINIC | Age: 74
End: 2019-10-31

## 2019-10-31 VITALS
TEMPERATURE: 98.8 F | OXYGEN SATURATION: 93 % | DIASTOLIC BLOOD PRESSURE: 54 MMHG | HEIGHT: 71 IN | WEIGHT: 213.6 LBS | HEART RATE: 65 BPM | BODY MASS INDEX: 29.9 KG/M2 | SYSTOLIC BLOOD PRESSURE: 106 MMHG

## 2019-10-31 DIAGNOSIS — I25.810 CORONARY ARTERY DISEASE INVOLVING AUTOLOGOUS ARTERY CORONARY BYPASS GRAFT WITHOUT ANGINA PECTORIS: ICD-10-CM

## 2019-10-31 DIAGNOSIS — I10 BENIGN ESSENTIAL HYPERTENSION: Primary | ICD-10-CM

## 2019-10-31 DIAGNOSIS — J01.90 ACUTE SINUSITIS WITH SYMPTOMS > 10 DAYS: ICD-10-CM

## 2019-10-31 DIAGNOSIS — E78.2 MIXED HYPERLIPIDEMIA: ICD-10-CM

## 2019-10-31 DIAGNOSIS — Z12.5 SPECIAL SCREENING FOR MALIGNANT NEOPLASM OF PROSTATE: ICD-10-CM

## 2019-10-31 LAB
ALBUMIN SERPL-MCNC: 4.1 G/DL (ref 3.6–5.1)
ALBUMIN/GLOB SERPL: 1.5 {RATIO} (ref 1–2.5)
ALP SERPL-CCNC: 83 U/L (ref 33–130)
ALT 1742-6: 7 U/L (ref 5–30)
AST 1920-8: 15 U/L (ref 7–31)
BILIRUB SERPL-MCNC: 1.1 MG/DL (ref 0.2–1.2)
BUN SERPL-MCNC: 13 MG/DL (ref 7–25)
BUN/CREATININE RATIO: 10.7 (ref 6–22)
CALCIUM SERPL-MCNC: 9.5 MG/DL (ref 8.6–10.3)
CHLORIDE SERPLBLD-SCNC: 105.5 MMOL/L (ref 98–110)
CHOLEST SERPL-MCNC: 152 MG/DL (ref 0–199)
CHOLEST/HDLC SERPL: 3 {RATIO} (ref 0–5)
CO2 SERPL-SCNC: 24.8 MMOL/L (ref 20–32)
CREAT SERPL-MCNC: 1.21 MG/DL (ref 0.7–1.18)
GFR SERPL CREATININE-BSD FRML MDRD: 72 ML/MIN/1.73M2
GLOBULIN, CALCULATED - QUEST: 2.7 (ref 1.9–3.7)
GLUCOSE SERPL-MCNC: 101 MG/DL (ref 60–99)
HDLC SERPL-MCNC: 52 MG/DL (ref 40–150)
LDLC SERPL CALC-MCNC: 87 MG/DL (ref 0–130)
POTASSIUM SERPL-SCNC: 4.23 MMOL/L (ref 3.5–5.3)
PROT SERPL-MCNC: 6.8 G/DL (ref 6.1–8.1)
SODIUM SERPL-SCNC: 139.2 MMOL/L (ref 135–146)
TRIGL SERPL-MCNC: 67 MG/DL (ref 0–149)

## 2019-10-31 PROCEDURE — 80061 LIPID PANEL: CPT | Performed by: FAMILY MEDICINE

## 2019-10-31 PROCEDURE — 36415 COLL VENOUS BLD VENIPUNCTURE: CPT | Performed by: FAMILY MEDICINE

## 2019-10-31 PROCEDURE — 80053 COMPREHEN METABOLIC PANEL: CPT | Performed by: FAMILY MEDICINE

## 2019-10-31 PROCEDURE — 99214 OFFICE O/P EST MOD 30 MIN: CPT | Performed by: FAMILY MEDICINE

## 2019-10-31 RX ORDER — ATORVASTATIN CALCIUM 40 MG/1
40 TABLET, FILM COATED ORAL DAILY
Qty: 90 TABLET | Refills: 1 | Status: SHIPPED | OUTPATIENT
Start: 2019-10-31 | End: 2020-04-17

## 2019-10-31 RX ORDER — AMOXICILLIN 875 MG
875 TABLET ORAL 2 TIMES DAILY
Qty: 20 TABLET | Refills: 0 | Status: SHIPPED | OUTPATIENT
Start: 2019-10-31 | End: 2019-11-07

## 2019-10-31 RX ORDER — ATENOLOL 25 MG/1
25 TABLET ORAL DAILY
Qty: 90 TABLET | Refills: 1 | Status: SHIPPED | OUTPATIENT
Start: 2019-10-31 | End: 2019-11-12

## 2019-10-31 ASSESSMENT — MIFFLIN-ST. JEOR: SCORE: 1733.07

## 2019-10-31 NOTE — NURSING NOTE
Jam is here today for a fasting med recheck and discuss his ongoing sinus infection.    Pre-visit Screening:  Immunizations:  up to date  Colonoscopy:  is up to date  Mammogram: NICK  Asthma Action Test/Plan:  NICK  PHQ9:  NA  GAD7:  NA  Questioned patient about current smoking habits Pt. has never smoked.  Ok to leave detailed message on voice mail for today's visit only Yes, phone # [phone]

## 2019-10-31 NOTE — PROGRESS NOTES
Subjective     Jose David Zhang is a 74 year old male who presents to clinic today for the following health issues:    HPI   Hyperlipidemia Follow-Up      Are you having any of the following symptoms? (Select all that apply)  No complaints of shortness of breath, chest pain or pressure.  No increased sweating or nausea with activity.  No left-sided neck or arm pain.  No complaints of pain in calves when walking 1-2 blocks.    Are you regularly taking any medication or supplement to lower your cholesterol?   Yes- statin    Are you having muscle aches or other side effects that you think could be caused by your cholesterol lowering medication?  No    Hypertension Follow-up      Do you check your blood pressure regularly outside of the clinic? Yes     Are you following a low salt diet? No    Are your blood pressures ever more than 140 on the top number (systolic) OR more   than 90 on the bottom number (diastolic), for example 140/90? No    Vascular Disease Follow-up      Are you having any of the following symptoms? (Select all that apply) No complaints of shortness of breath, chest pain or pressure.  No increased sweating or nausea with activity.  No left-sided neck or arm pain.  No complaints of pain in calves when walking 1-2 blocks.    How often do you take nitroglycerin? Never    Do you take an aspirin every day? Yes      How many servings of fruits and vegetables do you eat daily?  0-1    On average, how many sweetened beverages do you drink each day (soda, juice, sweet tea, etc)?   1    How many days per week do you miss taking your medication? 0          Patient Active Problem List   Diagnosis     Mixed hyperlipidemia     BENIGN CORDELIA CEREBR MENINGES/meningioma     Health Care Home     Acromioclavicular joint arthritis     ACP (advance care planning)     Coronary artery disease involving autologous artery coronary bypass graft without angina pectoris     Nonintractable epilepsy without status epilepticus,  unspecified epilepsy type (H)     Benign essential hypertension     Traumatic brain injury with loss of consciousness (H)     Past Surgical History:   Procedure Laterality Date     C CABG, ARTERIAL, FOUR+  10/9/01    FSD     EYE EXAM ESTABLISHED PT  2003     HC COLONOSCOPY THRU STOMA, DIAGNOSTIC  11/01/2016    Patient Reported     HCL PSA, DIAGNOSTIC (TUMOR MARKER)  2001     MOHS MICROGRAPHIC PROCEDURE  12/13/2018    DR Wiggins/ nose     SIGMOID[facility]COPY,DIAGNOSTIC  [facility]       Social History     Tobacco Use     Smoking status: Never Smoker     Smokeless tobacco: Never Used     Tobacco comment: occasional cigar   Substance Use Topics     Alcohol use: Yes     Alcohol/week: 2.5 standard drinks     Types: 3 Standard drinks or equivalent per week     Family History   Problem Relation Age of Onset     Heart Disease Father      Cancer No family hx of      Diabetes No family hx of          Current Outpatient Medications   Medication Sig Dispense Refill     ASPIRIN 81 MG OR TABS one daily 100 0     atenolol (TENORMIN) 25 MG tablet Take 1 tablet (25 mg) by mouth daily 90 tablet 1     atorvastatin (LIPITOR) 40 MG tablet Take 1 tablet (40 mg) by mouth daily 90 tablet 1     doxycycline hyclate (VIBRA-TABS) 100 MG tablet Take 2 tablets (200 mg) by mouth daily For a single dose 2 tablet 0     Multiple Vitamin (MULTI VITAMIN MENS) TABS Take 1 tablet by mouth daily.       Allergies   Allergen Reactions     Augmentin      Recent Labs   Lab Test 04/23/19  1124 10/29/18  1035 04/30/18  0922   LDL 87 106* 88   HDL 44 51 41   TRIG 129 148 111   ALT 14 16 14   CR 1.16 1.16 1.09   GFRESTIMATED 63 63 68   POTASSIUM 5.1 4.6 4.9      BP Readings from Last 3 Encounters:   10/31/19 106/54   09/26/19 122/82   08/19/19 116/68    Wt Readings from Last 3 Encounters:   10/31/19 96.9 kg (213 lb 9.6 oz)   09/26/19 96.6 kg (213 lb)   08/19/19 95.3 kg (210 lb 3.2 oz)                    RESPIRATORY SYMPTOMS      Duration: a few  "weeks    Description  rhinorrhea, facial pain/pressure, cough, chills, headache, fatigue/malaise and nausea    Severity: moderate    Accompanying signs and symptoms: None    History (predisposing factors):  Treated for sinusitis 9/26/19 with 10 days amox, felt mostly better but then symptoms seemed to not fully resolve-we tried augmentin and he developed hives-stopped meds and did not f/u-has not felt good ever since    Precipitating or alleviating factors: None    Therapies tried and outcome:  rest and fluids OTC NSAID    Reviewed and updated as needed this visit by Provider         Review of Systems   ROS COMP: Constitutional, HEENT, cardiovascular, pulmonary, gi and gu systems are negative, except as otherwise noted.      Objective    /54 (BP Location: Right arm, Patient Position: Sitting, Cuff Size: Adult Large)   Pulse 65   Temp 98.8  F (37.1  C) (Oral)   Ht 1.791 m (5' 10.5\")   Wt 96.9 kg (213 lb 9.6 oz)   SpO2 93%   BMI 30.22 kg/m    Body mass index is 30.22 kg/m .  Physical Exam   GENERAL: healthy, alert and no distress  EYES: Eyes grossly normal to inspection, PERRL and conjunctivae and sclerae normal  HENT: ear canals and TM's normal, nose and mouth without ulcers or lesions-tender over bilateral maxillary sinus'  NECK: no adenopathy, no asymmetry, masses, or scars and thyroid normal to palpation  RESP: lungs clear to auscultation - no rales, rhonchi or wheezes  CV: regular rate and rhythm, normal S1 S2, no S3 or S4, no murmur, click or rub, no peripheral edema and peripheral pulses strong  ABDOMEN: soft, nontender, no hepatosplenomegaly, no masses and bowel sounds normal  MS: no gross musculoskeletal defects noted, no edema  SKIN: no suspicious lesions or rashes  NEURO: Normal strength and tone, mentation intact and speech normal  PSYCH: mentation appears normal, affect normal/bright    Diagnostic Test Results:  Labs reviewed in Epic        Assessment & Plan   Assessment      Plan  (I10) " "Benign essential hypertension  (primary encounter diagnosis)  Comment: well controlled  Plan: Comprehensive Metobolic Panel ([facility]), VENOUS         COLLECTION, atenolol (TENORMIN) 25 MG tablet        continue current medications at current doses     (E78.2) Mixed hyperlipidemia  Comment: control uncertain  Plan: Lipid Panel ([facility]), Comprehensive Metobolic         Panel (BFP), VENOUS COLLECTION, atorvastatin         (LIPITOR) 40 MG tablet        continue current medications at current doses pending labs    (I25.810) Coronary artery disease involving autologous artery coronary bypass graft without angina pectoris  Comment: stable symptomatically   Plan: atorvastatin (LIPITOR) 40 MG tablet, atenolol         (TENORMIN) 25 MG tablet        continue current medications at current doses     (Z12.5) Special screening for malignant neoplasm of prostate  Comment:   Plan: HCL PSA, SCREENING ([facility]), VENOUS COLLECTION            (J01.90) Acute sinusitis with symptoms > 10 days  Comment: pt with ongoing symptoms of sinusitis-has always tolerated amoxicillin but had rash as soon as we swtiched to augmentin-he would like to try another course of amoxicillin-I did explain he may have developed a rash to this but he feels it was only the augmentin components  Plan: amoxicillin (AMOXIL) 875 MG tablet        Ok for rx, if any rash stop immediately, patient given instructions to go to emergency department immediately if worsening of symptoms and verbalizes this understanding     If sinus symptoms persist may need to see ENT or get sinus CT      BMI:   Estimated body mass index is 30.22 kg/m  as calculated from the following:    Height as of this encounter: 1.791 m (5' 10.5\").    Weight as of this encounter: 96.9 kg (213 lb 9.6 oz).   Weight management plan: Discussed healthy diet and exercise guidelines        FUTURE APPOINTMENTS:       - Follow-up visit in 6 mo  Work on weight loss  Regular exercise    No follow-ups on file.    Jose David" Kluwant Griffin MD  [facility]

## 2019-11-01 LAB — ABBOTT PSA - QUEST: 1.8 NG/ML

## 2019-11-02 ENCOUNTER — TELEPHONE (OUTPATIENT)
Dept: FAMILY MEDICINE | Facility: CLINIC | Age: 74
End: 2019-11-02

## 2019-11-02 NOTE — TELEPHONE ENCOUNTER
Jam was given Amoxicillin on 10/31/19.  He called this morning stating that he has had diarrhea and is going to go off the medication.  I offered him to send a note to the on call to switch the abx but he said no he will just go off and see what happens.  He still does not feel well, but he said he will come back in next week if not better.

## 2019-11-05 ENCOUNTER — TELEPHONE (OUTPATIENT)
Dept: CARDIOLOGY | Facility: CLINIC | Age: 74
End: 2019-11-05

## 2019-11-05 ENCOUNTER — OFFICE VISIT (OUTPATIENT)
Dept: FAMILY MEDICINE | Facility: CLINIC | Age: 74
End: 2019-11-05

## 2019-11-05 VITALS
RESPIRATION RATE: 20 BRPM | WEIGHT: 196 LBS | DIASTOLIC BLOOD PRESSURE: 70 MMHG | BODY MASS INDEX: 27.44 KG/M2 | SYSTOLIC BLOOD PRESSURE: 122 MMHG | HEART RATE: 64 BPM | HEIGHT: 71 IN | TEMPERATURE: 97.8 F

## 2019-11-05 DIAGNOSIS — I48.91 ATRIAL FIBRILLATION (H): Primary | ICD-10-CM

## 2019-11-05 DIAGNOSIS — I49.9 CARDIAC ARRHYTHMIA, UNSPECIFIED CARDIAC ARRHYTHMIA TYPE: ICD-10-CM

## 2019-11-05 DIAGNOSIS — J32.1 CHRONIC FRONTAL SINUSITIS: Primary | ICD-10-CM

## 2019-11-05 DIAGNOSIS — I48.91 ATRIAL FIBRILLATION, UNSPECIFIED TYPE (H): ICD-10-CM

## 2019-11-05 PROCEDURE — 99214 OFFICE O/P EST MOD 30 MIN: CPT | Performed by: FAMILY MEDICINE

## 2019-11-05 PROCEDURE — 93000 ELECTROCARDIOGRAM COMPLETE: CPT | Performed by: FAMILY MEDICINE

## 2019-11-05 ASSESSMENT — MIFFLIN-ST. JEOR: SCORE: 1653.24

## 2019-11-05 NOTE — NURSING NOTE
Patient is scheduled for 11/7/2019 @ 12:45pm with     Dr. Asiya Patel  [facility] [place] [address]  [address]  [place] [address]  [phone] -- appt line  [phone] -- fax    I called patients cell  [phone], could not talk at this time. Patient said he will call back today to get his appt info.

## 2019-11-05 NOTE — NURSING NOTE
Jose David Zhang is here for sinuses for the past couple months. States was on the antibiotic, but gave him diarrhea so he stopped.    Questioned patient about current smoking habits.  Pt. has never smoked.  PULSE regular  My Chart: active  CLASSIFICATION OF OVERWEIGHT AND OBESITY BY BMI                        Obesity Class           BMI(kg/m2)  Underweight                                    < 18.5  Normal                                         18.5-24.9  Overweight                                     25.0-29.9  OBESITY                     I                  30.0-34.9                             II                 35.0-39.9  EXTREME OBESITY             III                >40                            Patient's  BMI Body mass index is 30.13 kg/m .  http://hin.nhlbi.nih.gov/menuplanner/menu.cgi  Pre-visit planning  Immunizations - up to date  Colonoscopy - declines  Mammogram -   Asthma -   PHQ9 -    GRETA-7 -

## 2019-11-05 NOTE — PROGRESS NOTES
"SUBJECTIVE:   Jose David Zhang is a 74 year old male who complains of nasal congestion, post nasal drip, yellow and green nasal discharge and facial pressure for 4 weeks. He denies a history of productive cough, sweats, chills, myalgias, wheezing and shortness of breath and denies a history of asthma. Patient does smoke cigarettes.    Pt was on 2 courses amoxicillin-felt a little better after first course but not second    Pt was actually switched to augmentin for second course but had diarrhea and was only willing to retry amoxicillin    PT has not diarrhea now-off abx but sinus issues persist.    Pt states he has had a lot of sinus issues over the years-this is worst    He denies chest pain or shortness of breath but states he just feels terrible     OBJECTIVE:/70 (BP Location: Right arm, Patient Position: Chair, Cuff Size: Adult Regular)   Pulse 64   Temp 97.8  F (36.6  C) (Oral)   Resp 20   Ht 1.791 m (5' 10.5\")   Wt 88.9 kg (196 lb)   BMI 27.73 kg/m     He appears mildly ill, vital signs are as noted by the nurse- I feel his pulse to be irregular on initial exam. Ears normal.  Throat and pharynx normal.  Neck supple. No adenopathy in the neck. Nose is congested. Sinuses  Tender-frontal sinus. The chest is clear, without wheezes or rales. CVS exam: irregularly irregular rhythm, chest is clear without rales or wheezing, no pedal edema, no JVD, no hepatosplenomegaly.     EKG -a fib with mild RVR    (J32.1) Chronic frontal sinusitis  (primary encounter diagnosis)  Comment: pt may have frontal sinus issues but suspect his feeling ill is related to a fib, RVR  Plan: OTOLARYNGOLOGY REFERRAL        Will hold on referral for now until [facility] situation solved    (I49.9) Cardiac arrhythmia, unspecified cardiac arrhythmia type  Comment:   Plan: EKG 12-lead complete w/read - Clinics            (I48.91) Atrial fibrillation, unspecified type (H)  Comment: new diagnosis -suspect this is causing him to feel " "\"terrible\"-discussed diagnosis in some detail  Plan: CARDIOLOGY EVAL ADULT REFERRAL, rivaroxaban         ANTICOAGULANT (XARELTO ANTICOAGULANT) 20 MG         TABS tablet        Increase atenolol to 50 mg daily, start Xarelto 20 mg daily, stop ASA, see cardiology asap- referral underway       "

## 2019-11-05 NOTE — TELEPHONE ENCOUNTER
Reply from Dr. Patel:  Yes please.  Also a 3-day heart monitor and transthoracic echocardiogram, if not already done.    Thanks.  SJ        Orders placed for CBC and TSH. No availability for 3-day zio or echo prior to OV 11/7/19.    Attempted to contact patient to set up same day labs with new OV 11/7/19. Unable to reach patient by phone. Message to scheduling to try to schedule CBC and TSH.

## 2019-11-05 NOTE — TELEPHONE ENCOUNTER
NewPatient OV 11-7-19 for new A Fib - . EKG, CMP, Lipids done 10-31-19 by PCP  No CBC or TSH done recently.     Will message Dr. Patel regarding labs.

## 2019-11-07 ENCOUNTER — OFFICE VISIT (OUTPATIENT)
Dept: CARDIOLOGY | Facility: CLINIC | Age: 74
End: 2019-11-07
Attending: FAMILY MEDICINE
Payer: MEDICARE

## 2019-11-07 VITALS
DIASTOLIC BLOOD PRESSURE: 81 MMHG | HEART RATE: 90 BPM | SYSTOLIC BLOOD PRESSURE: 131 MMHG | WEIGHT: 197 LBS | HEIGHT: 71 IN | OXYGEN SATURATION: 97 % | BODY MASS INDEX: 27.58 KG/M2

## 2019-11-07 DIAGNOSIS — I48.91 ATRIAL FIBRILLATION (H): ICD-10-CM

## 2019-11-07 DIAGNOSIS — I25.10 CORONARY ARTERY DISEASE INVOLVING NATIVE CORONARY ARTERY OF NATIVE HEART WITHOUT ANGINA PECTORIS: ICD-10-CM

## 2019-11-07 DIAGNOSIS — Z95.1 S/P CABG (CORONARY ARTERY BYPASS GRAFT): ICD-10-CM

## 2019-11-07 DIAGNOSIS — N18.30 CKD (CHRONIC KIDNEY DISEASE) STAGE 3, GFR 30-59 ML/MIN (H): ICD-10-CM

## 2019-11-07 DIAGNOSIS — I48.91 NEW ONSET ATRIAL FIBRILLATION (H): Primary | ICD-10-CM

## 2019-11-07 DIAGNOSIS — R53.83 OTHER FATIGUE: ICD-10-CM

## 2019-11-07 PROBLEM — I10 BENIGN ESSENTIAL HYPERTENSION: Status: RESOLVED | Noted: 2017-04-11 | Resolved: 2019-11-07

## 2019-11-07 LAB
BASOPHILS # BLD AUTO: 0.1 10E9/L (ref 0–0.2)
BASOPHILS NFR BLD AUTO: 1 %
BURR CELLS BLD QL SMEAR: SLIGHT
DIFFERENTIAL METHOD BLD: ABNORMAL
EOSINOPHIL # BLD AUTO: 0.4 10E9/L (ref 0–0.7)
EOSINOPHIL NFR BLD AUTO: 3 %
ERYTHROCYTE [DISTWIDTH] IN BLOOD BY AUTOMATED COUNT: 13 % (ref 10–15)
HCT VFR BLD AUTO: 43.6 % (ref 40–53)
HGB BLD-MCNC: 14.9 G/DL (ref 13.3–17.7)
LYMPHOCYTES # BLD AUTO: 4 10E9/L (ref 0.8–5.3)
LYMPHOCYTES NFR BLD AUTO: 30 %
MCH RBC QN AUTO: 31 PG (ref 26.5–33)
MCHC RBC AUTO-ENTMCNC: 34.2 G/DL (ref 31.5–36.5)
MCV RBC AUTO: 91 FL (ref 78–100)
METAMYELOCYTES # BLD: 0.1 10E9/L
METAMYELOCYTES NFR BLD MANUAL: 1 %
MONOCYTES # BLD AUTO: 0.3 10E9/L (ref 0–1.3)
MONOCYTES NFR BLD AUTO: 2 %
NEUTROPHILS # BLD AUTO: 8.4 10E9/L (ref 1.6–8.3)
NEUTROPHILS NFR BLD AUTO: 63 %
OVALOCYTES BLD QL SMEAR: SLIGHT
PLATELET # BLD AUTO: 370 10E9/L (ref 150–450)
PLATELET # BLD EST: ABNORMAL 10*3/UL
RBC # BLD AUTO: 4.8 10E12/L (ref 4.4–5.9)
TSH SERPL DL<=0.005 MIU/L-ACNC: 0.96 MU/L (ref 0.4–4)
WBC # BLD AUTO: 13.3 10E9/L (ref 4–11)

## 2019-11-07 PROCEDURE — 99204 OFFICE O/P NEW MOD 45 MIN: CPT | Performed by: INTERNAL MEDICINE

## 2019-11-07 PROCEDURE — 85025 COMPLETE CBC W/AUTO DIFF WBC: CPT | Performed by: INTERNAL MEDICINE

## 2019-11-07 PROCEDURE — 36415 COLL VENOUS BLD VENIPUNCTURE: CPT | Performed by: INTERNAL MEDICINE

## 2019-11-07 PROCEDURE — 84443 ASSAY THYROID STIM HORMONE: CPT | Performed by: INTERNAL MEDICINE

## 2019-11-07 SDOH — HEALTH STABILITY: MENTAL HEALTH: HOW OFTEN DO YOU HAVE A DRINK CONTAINING ALCOHOL?: 2-3 TIMES A WEEK

## 2019-11-07 ASSESSMENT — MIFFLIN-ST. JEOR: SCORE: 1657.78

## 2019-11-07 NOTE — PROGRESS NOTES
Clinic visit note dictated. Dictation reference number - [id number]              REVIEW OF SYSTEMS:  A comprehensive 10-point review of systems was completed and the pertinent positives are documented in the history of present illness.    Skin:  Negative     Eyes:  Positive for glasses  ENT:  Positive for tinnitus  Respiratory:  Positive for dyspnea on exertion  Cardiovascular:    Positive for;lightheadedness;fatigue  Gastroenterology: Positive for diarrhea  Genitourinary:  not assessed    Musculoskeletal:  not assessed    Neurologic:  Positive for headaches;seizures  Psychiatric:  not assessed    Heme/Lymph/Imm:  Positive for allergies  Endocrine:  Negative      CURRENT MEDICATIONS:  Current Outpatient Medications   Medication Sig Dispense Refill     atenolol (TENORMIN) 25 MG tablet Take 1 tablet (25 mg) by mouth daily (Patient taking differently: Take 50 mg by mouth daily ) 90 tablet 1     atorvastatin (LIPITOR) 40 MG tablet Take 1 tablet (40 mg) by mouth daily 90 tablet 1     Multiple Vitamin (MULTI VITAMIN MENS) TABS Take 1 tablet by mouth daily.       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, dr kelly /meningioma     Chronic ischemic heart disease, unspecified      EPILEPSY NOS W/O MENTN INTRACTABLE/due to tumor 12/1/2006     Fam hx-cardiovas dis NEC      Mixed hyperlipidemia      Postsurgical 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/01       PAST SURGICAL HISTORY:    Past Surgical History:   Procedure Laterality Date     C CABG, ARTERIAL, FOUR+  10/9/01    FSD     EYE EXAM ESTABLISHED PT  2003     HC COLONOSCOPY THRU STOMA, DIAGNOSTIC  11/01/2016    Patient Reported     HCL PSA,  DIAGNOSTIC (TUMOR MARKER)       MOHS MICROGRAPHIC PROCEDURE  2018    DR Wiggins/ nose     SIGMOIDOSCOPY,DIAGNOSTIC      [facility]       FAMILY HISTORY:    Family History   Problem Relation Age of Onset     Heart Disease Father          at 59     Cancer No family hx of      Diabetes No family hx of        SOCIAL HISTORY:    Social History     Socioeconomic History     Marital status:      Spouse name: Lilian     Number of children: 2     Years of education: 16     Highest education level: None   Occupational History     Occupation: Retired     Comment: Insurance, Banking   Social Needs     Financial resource strain: Not hard at all     Food insecurity:     Worry: Never true     Inability: Never true     Transportation needs:     Medical: No     Non-medical: No   Tobacco Use     Smoking status: Former Smoker     Packs/day: 0.25     Years: 20.00     Pack years: 5.00     Last attempt to quit:      Years since quittin.8     Smokeless tobacco: Never Used     Tobacco comment: occasional cigar   Substance and Sexual Activity     Alcohol use: Yes     Alcohol/week: 2.5 standard drinks     Types: 3 Standard drinks or equivalent per week     Frequency: 2-3 times a week     Comment: 1L / month (avg 3 drinks/week)     Drug use: No     Sexual activity: Never     Partners: Female   Lifestyle     Physical activity:     Days per week: None     Minutes per session: None     Stress: None   Relationships     Social connections:     Talks on phone: None     Gets together: None     Attends Anabaptist service: None     Active member of club or organization: None     Attends meetings of clubs or organizations: None     Relationship status: None     Intimate partner violence:     Fear of current or ex partner: None     Emotionally abused: None     Physically abused: None     Forced sexual activity: None   Other Topics Concern      Service Not Asked     Blood Transfusions Not Asked     Caffeine Concern Not Asked  "    Occupational Exposure Not Asked     Hobby Hazards Not Asked     Sleep Concern Not Asked     Stress Concern Not Asked     Weight Concern Not Asked     Special Diet Not Asked     Back Care Not Asked     Exercise Yes     Bike Helmet Not Asked     Seat Belt Yes     Self-Exams Yes     Parent/sibling w/ CABG, MI or angioplasty before 65F 55M? Yes   Social History Narrative     None       PHYSICAL EXAM:    Vitals: /81   Pulse 90   Ht 1.791 m (5' 10.5\")   Wt 89.4 kg (197 lb)   SpO2 97%   BMI 27.87 kg/m    Wt Readings from Last 5 Encounters:   11/07/19 89.4 kg (197 lb)   11/05/19 88.9 kg (196 lb)   10/31/19 96.9 kg (213 lb 9.6 oz)   09/26/19 96.6 kg (213 lb)   08/19/19 95.3 kg (210 lb 3.2 oz)       Constitutional: Comfortable at rest. Cooperative, alert and oriented, well developed, well nourished.  Eyes: Pupils equal and round, conjunctivae and lids unremarkable, sclera white, no xanthalasma,   ENT: Satisfactory dentition.  No cyanosis or pallor.  Neck: Carotid pulses are full and equal bilaterally, no carotid bruit, no thyromegaly     Respiratory: Normal respiratory effort with symmetrical chest wall movements and no use of accessory muscles. Good air entry with normal breath sounds and no rales or wheeze.  Cardiovascular: Heart sounds are irregularly regular, no audible murmur, no vascular bruit.  Apical impulse nondisplaced.  Midline well-healed sternotomy scar.  GI: Soft, nontender, no hepatosplenomegaly, no masses, active bowel sounds.    Skin: No rash, erythema, ecchymosis.  Musculoskeletal: Normal muscle tone and strength. Normal gait. No spinal deformities.  Neuropsychiatric: Oriented to time place and person.  Affect normal.  No gross motor deficits.  Extremities: No edema. No clubbing or deformities.        Encounter Diagnoses   Name Primary?     New onset atrial fibrillation (H) Yes     S/P CABG (coronary artery bypass graft)      Coronary artery disease involving native coronary artery of native " heart without angina pectoris      Other fatigue      CKD (chronic kidney disease) stage 3, GFR 30-59 ml/min (H)        No orders of the defined types were placed in this encounter.

## 2019-11-07 NOTE — PATIENT INSTRUCTIONS
1.  I agree with Dr. Griffin's management of your atrial fibrillation.  Continue the atenolol and Xarelto blood thinner.    2.  I recommend that you undergo an echocardiogram (ultrasound of the heart), heart monitor, and consider an electrical cardioversion procedure after 4 weeks of being on Xarelto.    3.  Per your wishes, we will wait to hear from you to proceed with the above.    If you have any questions or concerns, please contact my nurses at [phone].

## 2019-11-07 NOTE — PROGRESS NOTES
Service Date: 11/07/2019      LOCATION:  [facility]       PRIMARY CARE AND REFERRING PROVIDER:  Dr. Jose David Griffin, [place] [address].      REASON FOR VISIT:   1.  New diagnosis of atrial fibrillation.      HISTORY OF PRESENT ILLNESS:     Jam Zhang is a very pleasant 74-year-old retired [employer] , accompanied by his wife, Kera, today.  Jam is disabled due to Agent Orange-related complications from his [employer] days.  His medical history is significant for remote CABG in 2001 (they used both the radial artery grafts, other details not known) without recurrent angina, occasional tobacco user (smokes 2-3 times a year), BMI 28 kg/m2.      I am seeing him for a new diagnosis of incidentally found atrial fibrillation, when he was being evaluated for recent sinusitis.      For the last month, he has had nasal blockage, cough and postnasal drip.  He was initially treated with Augmentin, did not improve and a second antibiotic agent was added.  With this, he reports significant exercise intolerance, fatigue, diarrhea.  He does not have any lower extremity edema, orthopnea, PND or angina.  On a visit with Dr. Griffin two days ago, he was noted to have an irregular heartbeat leading to an ECG which confirmed atrial fibrillation with ventricular rates of 110 BPM (patient is asymptomatic).  Dr. Griffin appropriately increased his atenolol from 25 mg to 50 mg daily, and started rivaroxaban 20 mg daily.      The patient has not had a stroke, no diabetes or hypertension, normal TSH of 0.96, normal hemoglobin of 14.9, stage III CKD with a creatinine of 1.2 and estimated GFR of 72, drinks alcohol 3-4 times a week (2-3 scotch each time), 1-2 servings of caffeine daily.  No concerning family history.      DIAGNOSTIC DATA:     Labs and ECG -- as above.      He has not had any recent cardiac imaging.  In 04/2017 he had an echocardiogram at [facility] by Dr. Sharlene Jaime.   LVEF was reported as 55%-60%, moderately dilated left atrium, mildly dilated right atrium, normal RV function, no significant valve disease.      PHYSICAL EXAMINATION:  Exam is pertinent for a non-obese gentleman, with well-healed midline sternotomy scar and healed scars on both his forearms (radial artery graft harvesting).  Irregularly irregular heart sounds, no murmur, no lower extremity edema, no vascular bruit.  Mentally alert and oriented.      DIAGNOSES, ASSESSMENT, PLAN:   1.  New diagnosis of atrial fibrillation.   2.  Coronary artery disease, status post CABG x4 in , without recurrent angina.   3.  CHADS-VASc score is 2.  Agree with long-term anticoagulation.      1.  I agree with rate control and anticoagulation for now as he is asymptomatic.  He does have symptoms of sinusitis, but his effort intolerance may be related to the atrial fibrillation.  We know that in  when he had the echocardiogram, he was in sinus rhythm.  I talked to him about remaining on the atenolol 50 mg and Xarelto for now.  After 4 weeks of anticoagulation with Xarelto, elective electrical cardioversion would be reasonable.  Prior to that, he will need a transthoracic echocardiogram and a heart monitor.   2.  The patient believes in holistic healing.  He wants some time to allow his body to heal, discuss next steps with his primary doctor, Dr. Griffin, and will call our office if he chooses to proceed.  Therefore, no orders for testing or cardioversion were placed today, but we will be happy to see him as needed.      It was my pleasure to see this very nice gentleman and his wife.         BRICE VALLADARES MD             D: 2019   T: 2019   MT: JOSE      Name:     ARMINDA MCBRIDE   MRN:      [id number]        Account:      [id number]   :      1945           Service Date: 2019      Document: [id number]

## 2019-11-07 NOTE — LETTER
11/7/2019      Jose David Griffin MD  [address], [id number]  [facility] [address]      RE: Jose David Zhang       Dear Colleague,    I had the pleasure of seeing Jose David Zhang in the [facility].    Service Date: 11/07/2019      LOCATION:  [facility]       PRIMARY CARE AND REFERRING PROVIDER:  Dr. Jose David Griffin, [place] [address].      REASON FOR VISIT:   1.  New diagnosis of atrial fibrillation.      HISTORY OF PRESENT ILLNESS:     Jam Zhang is a very pleasant 74-year-old retired [employer] , accompanied by his wife, Kera, today.  Jam is disabled due to Agent Orange-related complications from his [employer] days.  His medical history is significant for remote CABG in 2001 (they used both the radial artery grafts, other details not known) without recurrent angina, occasional tobacco user (smokes 2-3 times a year), BMI 28 kg/m2.      I am seeing him for new diagnosis of incidentally found atrial fibrillation, when he was being evaluated for recent sinusitis.      For the last month, he has had nasal blockage, cough and postnasal drip.  He was initially treated with Augmentin, did not improve and a second antibiotic agent was added.  With this, he reports significant exercise intolerance, fatigue, diarrhea.  He does not have any lower extremity edema, orthopnea, PND or angina.  On a visit with Dr. Griffin 2 days ago, he was noted to have an irregular heartbeat leading to an ECG which confirmed atrial fibrillation with ventricular rates of 110 BPM (patient is asymptomatic).  Dr. Griffin appropriately increased his atenolol from 25 mg to 50 mg daily, and started rivaroxaban 20 mg daily.      The patient has not had a stroke, no diabetes or hypertension, normal TSH of 0.96, normal hemoglobin of 14.9, stage III CKD with a creatinine of 1.2 and estimated GFR of 72, drinks alcohol 3-4 times a week (2-3 scotch each time), 1-2 servings of caffeine daily.   No concerning family history.      DIAGNOSTIC DATA:     Labs -- as above.   ECG -- as above.      He has not had any recent cardiac imaging.  In 04/2017 he had an echocardiogram at [facility] by Dr. Sharlene Jaime.  LVEF was reported as 55%-60%, moderately dilated left atrium, mildly dilated right atrium, normal RV function, no significant valve disease.      PHYSICAL EXAMINATION:  Exam is pertinent for a nonobese gentleman, with well-healed midline sternotomy scar and healed scars on both his forearms (radial artery graft harvesting).  Irregularly irregular heart sounds, no murmur, no lower extremity edema, no vascular bruit.  Mentally alert and oriented.      DIAGNOSES, ASSESSMENT, PLAN:   1.  New diagnosis of atrial fibrillation.   2.  Coronary artery disease, status post CABG x4 in 2001, without recurrent angina.   3.  CHADS-VASc score is 2.  Agree with long-term anticoagulation.      1.  I agree with rate control and anticoagulation for now as he is asymptomatic.  He does have symptoms of sinusitis, but his effort intolerance may be related to the atrial fibrillation.  We know that in 2017 when he had the echocardiogram, he was in sinus rhythm.  I talked to him about remaining on the atenolol 50 mg and Xarelto for now.  After 4 weeks of anticoagulation with Xarelto, elective electrical cardioversion would be reasonable.  Prior to that, he will need a transthoracic echocardiogram and a heart monitor.   2.  The patient believes in holistic healing.  He wants some time to allow his body to heal, discuss next steps with his primary doctor, Dr. Griffin, and will call our office if he chooses to proceed.  Therefore, no orders for testing or cardioversion were placed today, but we will be happy to see him as needed.      It was my pleasure to see this very nice gentleman and his wife.         BRICE VALLADARES MD             D: 11/07/2019   T: 11/07/2019   MT: JOSE      Name:     ARMINDA MCBRIDE    MRN:      [id number]        Account:      [id number]   :      1945           Service Date: 2019      Document: [id number]         Outpatient Encounter Medications as of 2019   Medication Sig Dispense Refill     atorvastatin (LIPITOR) 40 MG tablet Take 1 tablet (40 mg) by mouth daily 90 tablet 1     Multiple Vitamin (MULTI VITAMIN MENS) TABS Take 1 tablet by mouth daily.       rivaroxaban ANTICOAGULANT (XARELTO ANTICOAGULANT) 20 MG TABS tablet Take 1 tablet (20 mg) by mouth daily (with dinner) 14 tablet 0     [DISCONTINUED] atenolol (TENORMIN) 25 MG tablet Take 1 tablet (25 mg) by mouth daily (Patient taking differently: Take 50 mg by mouth daily ) 90 tablet 1     [DISCONTINUED] amoxicillin (AMOXIL) 875 MG tablet Take 1 tablet (875 mg) by mouth 2 times daily (Patient not taking: Reported on 2019) 20 tablet 0     [DISCONTINUED] ASPIRIN 81 MG OR TABS one daily 100 0     [DISCONTINUED] doxycycline hyclate (VIBRA-TABS) 100 MG tablet Take 2 tablets (200 mg) by mouth daily For a single dose (Patient not taking: Reported on 2019) 2 tablet 0     No facility-administered encounter medications on file as of 2019.        Again, thank you for allowing me to participate in the care of your patient.      Sincerely,    Asiya Patel MD     [facility]

## 2019-11-07 NOTE — LETTER
11/7/2019    Jose David Griffin MD  [address], [address]  [facility] [address]    RE: Jose David Zhang       Dear Colleague,    I had the pleasure of seeing Jose David Zhang in the [facility].    Clinic visit note dictated. Dictation reference number - [id number]              REVIEW OF SYSTEMS:  A comprehensive 10-point review of systems was completed and the pertinent positives are documented in the history of present illness.    Skin:  Negative     Eyes:  Positive for glasses  ENT:  Positive for tinnitus  Respiratory:  Positive for dyspnea on exertion  Cardiovascular:    Positive for;lightheadedness;fatigue  Gastroenterology: Positive for diarrhea  Genitourinary:  not assessed    Musculoskeletal:  not assessed    Neurologic:  Positive for headaches;seizures  Psychiatric:  not assessed    Heme/Lymph/Imm:  Positive for allergies  Endocrine:  Negative      CURRENT MEDICATIONS:  Current Outpatient Medications   Medication Sig Dispense Refill     atenolol (TENORMIN) 25 MG tablet Take 1 tablet (25 mg) by mouth daily (Patient taking differently: Take 50 mg by mouth daily ) 90 tablet 1     atorvastatin (LIPITOR) 40 MG tablet Take 1 tablet (40 mg) by mouth daily 90 tablet 1     Multiple Vitamin (MULTI VITAMIN MENS) TABS Take 1 tablet by mouth daily.       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, dr kelly /meningioma     Chronic ischemic heart disease, unspecified      EPILEPSY NOS W/O MENTN INTRACTABLE/due to tumor 12/1/2006     Fam hx-cardiovas dis NEC      Mixed hyperlipidemia      Postsurgical 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/01       PAST SURGICAL  HISTORY:    Past Surgical History:   Procedure Laterality Date     C CABG, ARTERIAL, FOUR+  10/9/01    FSD     EYE EXAM ESTABLISHED PT       HC COLONOSCOPY THRU STOMA, DIAGNOSTIC  2016    Patient Reported     HCL PSA, DIAGNOSTIC (TUMOR MARKER)       MOHS MICROGRAPHIC PROCEDURE  2018    DR Wiggins/ nose     SIGMOIDOSCOPY,DIAGNOSTIC      [facility]       FAMILY HISTORY:    Family History   Problem Relation Age of Onset     Heart Disease Father          at 59     Cancer No family hx of      Diabetes No family hx of        SOCIAL HISTORY:    Social History     Socioeconomic History     Marital status:      Spouse name: Lilian     Number of children: 2     Years of education: 16     Highest education level: None   Occupational History     Occupation: Retired     Comment: Insurance, Banking   Social Needs     Financial resource strain: Not hard at all     Food insecurity:     Worry: Never true     Inability: Never true     Transportation needs:     Medical: No     Non-medical: No   Tobacco Use     Smoking status: Former Smoker     Packs/day: 0.25     Years: 20.00     Pack years: 5.00     Last attempt to quit: 1990     Years since quittin.8     Smokeless tobacco: Never Used     Tobacco comment: occasional cigar   Substance and Sexual Activity     Alcohol use: Yes     Alcohol/week: 2.5 standard drinks     Types: 3 Standard drinks or equivalent per week     Frequency: 2-3 times a week     Comment: 1L / month (avg 3 drinks/week)     Drug use: No     Sexual activity: Never     Partners: Female   Lifestyle     Physical activity:     Days per week: None     Minutes per session: None     Stress: None   Relationships     Social connections:     Talks on phone: None     Gets together: None     Attends Bahai service: None     Active member of club or organization: None     Attends meetings of clubs or organizations: None     Relationship status: None     Intimate partner violence:     Fear of current  "or ex partner: None     Emotionally abused: None     Physically abused: None     Forced sexual activity: None   Other Topics Concern      Service Not Asked     Blood Transfusions Not Asked     Caffeine Concern Not Asked     Occupational Exposure Not Asked     Hobby Hazards Not Asked     Sleep Concern Not Asked     Stress Concern Not Asked     Weight Concern Not Asked     Special Diet Not Asked     Back Care Not Asked     Exercise Yes     Bike Helmet Not Asked     Seat Belt Yes     Self-Exams Yes     Parent/sibling w/ CABG, MI or angioplasty before 65F 55M? Yes   Social History Narrative     None       PHYSICAL EXAM:    Vitals: /81   Pulse 90   Ht 1.791 m (5' 10.5\")   Wt 89.4 kg (197 lb)   SpO2 97%   BMI 27.87 kg/m     Wt Readings from Last 5 Encounters:   11/07/19 89.4 kg (197 lb)   11/05/19 88.9 kg (196 lb)   10/31/19 96.9 kg (213 lb 9.6 oz)   09/26/19 96.6 kg (213 lb)   08/19/19 95.3 kg (210 lb 3.2 oz)       Constitutional: Comfortable at rest. Cooperative, alert and oriented, well developed, well nourished.  Eyes: Pupils equal and round, conjunctivae and lids unremarkable, sclera white, no xanthalasma,   ENT: Satisfactory dentition.  No cyanosis or pallor.  Neck: Carotid pulses are full and equal bilaterally, no carotid bruit, no thyromegaly     Respiratory: Normal respiratory effort with symmetrical chest wall movements and no use of accessory muscles. Good air entry with normal breath sounds and no rales or wheeze.  Cardiovascular: Heart sounds are irregularly regular, no audible murmur, no vascular bruit.  Apical impulse nondisplaced.  Midline well-healed sternotomy scar.  GI: Soft, nontender, no hepatosplenomegaly, no masses, active bowel sounds.    Skin: No rash, erythema, ecchymosis.  Musculoskeletal: Normal muscle tone and strength. Normal gait. No spinal deformities.  Neuropsychiatric: Oriented to time place and person.  Affect normal.  No gross motor deficits.  Extremities: No edema. " No clubbing or deformities.        Encounter Diagnoses   Name Primary?     New onset atrial fibrillation (H) Yes     S/P CABG (coronary artery bypass graft)      Coronary artery disease involving native coronary artery of native heart without angina pectoris      Other fatigue      CKD (chronic kidney disease) stage 3, GFR 30-59 ml/min (H)        No orders of the defined types were placed in this encounter.        Thank you for allowing me to participate in the care of your patient.      Sincerely,     Asiya Patel MD     [facility]    cc:   Jose David Griffin MD  [address], [address] [address]

## 2019-11-12 ENCOUNTER — OFFICE VISIT (OUTPATIENT)
Dept: FAMILY MEDICINE | Facility: CLINIC | Age: 74
End: 2019-11-12

## 2019-11-12 VITALS
BODY MASS INDEX: 27.52 KG/M2 | TEMPERATURE: 97.8 F | RESPIRATION RATE: 20 BRPM | HEIGHT: 71 IN | HEART RATE: 74 BPM | SYSTOLIC BLOOD PRESSURE: 102 MMHG | WEIGHT: 196.6 LBS | DIASTOLIC BLOOD PRESSURE: 72 MMHG

## 2019-11-12 DIAGNOSIS — J40 BRONCHITIS: ICD-10-CM

## 2019-11-12 DIAGNOSIS — I48.91 ATRIAL FIBRILLATION, UNSPECIFIED TYPE (H): Primary | ICD-10-CM

## 2019-11-12 PROCEDURE — 99213 OFFICE O/P EST LOW 20 MIN: CPT | Performed by: FAMILY MEDICINE

## 2019-11-12 RX ORDER — ATENOLOL 50 MG/1
50 TABLET ORAL DAILY
Qty: 90 TABLET | Refills: 1 | COMMUNITY
Start: 2019-11-12 | End: 2020-04-17

## 2019-11-12 RX ORDER — AZITHROMYCIN 250 MG/1
TABLET, FILM COATED ORAL
Qty: 6 TABLET | Refills: 0 | Status: SHIPPED | OUTPATIENT
Start: 2019-11-12 | End: 2020-04-17

## 2019-11-12 ASSESSMENT — MIFFLIN-ST. JEOR: SCORE: 1655.96

## 2019-11-12 NOTE — PROGRESS NOTES
SUBJECTIVE:  Jose David Zhang, a 74 year old male scheduled an appointment to discuss the following issues:     Atrial fibrillation, unspecified type (H)  Bronchitis  Pt here to discuss recommendations of cardiology-echo, rhythm monitor, cardioversion--on xarelto and atenolol    Pt still c/o cough and lingering congestion-was treated with 2 abx for sinus recently, seems better with sinus but not cough, no fevers. NO shortness of breath . NO smoking.  Denies wheezing    Medical, social, surgical, and family histories reviewed.    .hx     Patient Active Problem List   Diagnosis     BENIGN CORDELIA CEREBR MENINGES/meningioma     Health Care Home     Acromioclavicular joint arthritis     ACP (advance care planning)     Coronary artery disease involving autologous artery coronary bypass graft without angina pectoris     Nonintractable epilepsy without status epilepticus, unspecified epilepsy type (H)     Traumatic brain injury with loss of consciousness (H)     Atrial fibrillation (H)     Past Medical History:   Diagnosis Date     Benign neoplasm of cerebral meninges (H)     left sphenoid wing, dr kelly /meningioma     Chronic ischemic heart disease, unspecified      EPILEPSY NOS W/O MENTN INTRACTABLE/due to tumor 2006     Fam hx-cardiovas dis NEC      Mixed hyperlipidemia      Postsurgical 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/01     Family History   Problem Relation Age of Onset     Heart Disease Father          at 59     Cancer No family hx of      Diabetes No family hx of      Social History     Socioeconomic History     Marital status:      Spouse name: Lilian     Number of children: 2     Years of education: 16     Highest education level: Not on file   Occupational History     Occupation: Retired     Comment: Insurance, Banking   Social Needs     Financial resource strain: Not hard at all     Food insecurity:     Worry: Never true     Inability: Never true     Transportation needs:     Medical: No      Non-medical: No   Tobacco Use     Smoking status: Former Smoker     Packs/day: 0.25     Years: 20.00     Pack years: 5.00     Last attempt to quit:      Years since quittin.8     Smokeless tobacco: Never Used     Tobacco comment: occasional cigar   Substance and Sexual Activity     Alcohol use: Yes     Alcohol/week: 2.5 standard drinks     Types: 3 Standard drinks or equivalent per week     Frequency: 2-3 times a week     Comment: 1L / month (avg 3 drinks/week)     Drug use: No     Sexual activity: Never     Partners: Female   Lifestyle     Physical activity:     Days per week: Not on file     Minutes per session: Not on file     Stress: Not on file   Relationships     Social connections:     Talks on phone: Not on file     Gets together: Not on file     Attends Mandaen service: Not on file     Active member of club or organization: Not on file     Attends meetings of clubs or organizations: Not on file     Relationship status: Not on file     Intimate partner violence:     Fear of current or ex partner: Not on file     Emotionally abused: Not on file     Physically abused: Not on file     Forced sexual activity: Not on file   Other Topics Concern      Service Not Asked     Blood Transfusions Not Asked     Caffeine Concern Not Asked     Occupational Exposure Not Asked     Hobby Hazards Not Asked     Sleep Concern Not Asked     Stress Concern Not Asked     Weight Concern Not Asked     Special Diet Not Asked     Back Care Not Asked     Exercise Yes     Bike Helmet Not Asked     Seat Belt Yes     Self-Exams Yes     Parent/sibling w/ CABG, MI or angioplasty before 65F 55M? Yes   Social History Narrative     Not on file     Past Surgical History:   Procedure Laterality Date     C CABG, ARTERIAL, FOUR+  10/9/01    FSD     EYE EXAM ESTABLISHED PT       HC COLONOSCOPY THRU STOMA, DIAGNOSTIC  2016    Patient Reported     HCL PSA, DIAGNOSTIC (TUMOR MARKER)       MOHS MICROGRAPHIC PROCEDURE   "12/13/2018    DR Wiggins/ nose     SIGMOIDOSCOPY,DIAGNOSTIC  1994    [facility]     atenolol (TENORMIN) 50 MG tablet, Take 1 tablet (50 mg) by mouth daily  atorvastatin (LIPITOR) 40 MG tablet, Take 1 tablet (40 mg) by mouth daily  Multiple Vitamin (MULTI VITAMIN MENS) TABS, Take 1 tablet by mouth daily.  rivaroxaban ANTICOAGULANT (XARELTO ANTICOAGULANT) 20 MG TABS tablet, Take 1 tablet (20 mg) by mouth daily (with dinner)    No current facility-administered medications on file prior to visit.        Allergies: Amoxicillin and Augmentin    Immunization History   Administered Date(s) Administered     Flu 65+ Years 10/08/2019     Influenza (High Dose) 3 valent vaccine 10/01/2014, 10/05/2016, 09/25/2017, 09/20/2018     Influenza (IIV3) PF 11/04/2008, 09/28/2011, 09/20/2012, 11/09/2013     Influenza Vaccine IM > 6 months Valent IIV4 09/29/2015     Pneumo Conj 13-V (2010&after) 10/05/2016     Pneumococcal 23 valent 10/12/2017     TD (ADULT, 7+) 01/01/2001, 06/04/2019     TDAP Vaccine (Boostrix) 04/15/2009        ROS:  CONSTITUTIONAL: NEGATIVE for fever, chills  EYES: NEGATIVE for vision changes   CV: NEGATIVE for chest pain, palpitations   GI: NEGATIVE for nausea, abdominal pain, heartburn, or change in bowel habits    OBJECTIVE:  /72 (BP Location: Right arm, Patient Position: Chair, Cuff Size: Adult Regular)   Pulse 74   Temp 97.8  F (36.6  C) (Oral)   Resp 20   Ht 1.791 m (5' 10.5\")   Wt 89.2 kg (196 lb 9.6 oz)   BMI 27.81 kg/m    EXAM:  GENERAL APPEARANCE: healthy, alert and no distress  EYES: EOMI,  PERRL  HENT: ear canals and TM's normal and nose and mouth without ulcers or lesions  RESP: lungs mainly clear to auscultation - Coarse upper airway sounds are noted that clear to a large extent with cough.   CV: irreg irreg   -  ABDOMEN:  soft, nontender, no HSM or masses and bowel sounds normal    ASSESSMENT/PLAN:  (I48.91) Atrial fibrillation, unspecified type (H)  (primary encounter diagnosis)  Comment: stable and " under rate control and anticoagulation  Plan: he agrees to f/u with proposed cardiology plan for cardioversion    (J40) Bronchitis  Comment: unclear why illness persists, mild symptoms   Plan: azithromycin (ZITHROMAX) 250 MG tablet        Will try zpack in case atypical bug, f/u if not improving or worsening     May need to consider pulmonary function testing in future

## 2019-11-12 NOTE — NURSING NOTE
Jose David Zhang is here for a recheck. Also still congested and has a cough    Questioned patient about current smoking habits.  Pt. quit smoking some time ago.  PULSE regular  My Chart: active  CLASSIFICATION OF OVERWEIGHT AND OBESITY BY BMI                        Obesity Class           BMI(kg/m2)  Underweight                                    < 18.5  Normal                                         18.5-24.9  Overweight                                     25.0-29.9  OBESITY                     I                  30.0-34.9                             II                 35.0-39.9  EXTREME OBESITY             III                >40                            Patient's  BMI Body mass index is 27.81 kg/m .  http://hin.nhlbi.nih.gov/menuplanner/menu.cgi  Pre-visit planning  Immunizations - up to date  Colonoscopy - declined  Mammogram -   Asthma -   PHQ9 -    GRETA-7 -

## 2019-11-13 ENCOUNTER — TELEPHONE (OUTPATIENT)
Dept: CARDIOLOGY | Facility: CLINIC | Age: 74
End: 2019-11-13

## 2019-11-13 DIAGNOSIS — I25.810 CORONARY ARTERY DISEASE INVOLVING AUTOLOGOUS ARTERY CORONARY BYPASS GRAFT WITHOUT ANGINA PECTORIS: ICD-10-CM

## 2019-11-13 DIAGNOSIS — I48.91 ATRIAL FIBRILLATION, UNSPECIFIED TYPE (H): Primary | ICD-10-CM

## 2019-11-13 NOTE — TELEPHONE ENCOUNTER
Call from patient, he is ready to set up the echo and heart monitor recommended by Dr. Patel at his OV on 11/7/19. He would like to wait to schedule the cardioversion until he has those results. He is taking his xarelto as directed and understand he has to wait 4 weeks before having the cardioversion if needed.    Will message Dr. Patel to see what monitor is preferred.    Will wait to place order for echo and monitor until details are planned.

## 2019-11-14 NOTE — TELEPHONE ENCOUNTER
Call from patient wondering about plan/recommendations for testing. States he is hoping to go to [place] to see his family for the holidays and would like to have all of his testing done prior to then. Message to Dr Patel.

## 2019-11-15 NOTE — TELEPHONE ENCOUNTER
"Asiya Patel MD  You; [facility] Heart Team 2 16 hours ago (4:52 PM)        24 hour Holter.      Last OV 11/7/19 w/ Dr Patel: \"1.  I agree with rate control and anticoagulation for now as he is asymptomatic.  He does have symptoms of sinusitis, but his effort intolerance may be related to the atrial fibrillation.  We know that in 2017 when he had the echocardiogram, he was in sinus rhythm.  I talked to him about remaining on the atenolol 50 mg and Xarelto for now.  After 4 weeks of anticoagulation with Xarelto, elective electrical cardioversion would be reasonable.  Prior to that, he will need a transthoracic echocardiogram and a heart monitor.\"     Orders entered for 24hr Holter and echo per Dr Patel, transferred to scheduling to arrange. Pt states he is feeling much better on his medications (atenolol & xarelto). Per phone note 11/13/19, pt waiting to schedule DCCV pending monitor & echo results.   "

## 2019-11-26 ENCOUNTER — HOSPITAL ENCOUNTER (OUTPATIENT)
Dept: CARDIOLOGY | Facility: CLINIC | Age: 74
End: 2019-11-26
Attending: INTERNAL MEDICINE
Payer: MEDICARE

## 2019-11-26 ENCOUNTER — HOSPITAL ENCOUNTER (OUTPATIENT)
Dept: CARDIOLOGY | Facility: CLINIC | Age: 74
Discharge: HOME | End: 2019-11-26
Attending: INTERNAL MEDICINE | Admitting: INTERNAL MEDICINE
Payer: MEDICARE

## 2019-11-26 ENCOUNTER — TELEPHONE (OUTPATIENT)
Dept: CARDIOLOGY | Facility: CLINIC | Age: 74
End: 2019-11-26

## 2019-11-26 DIAGNOSIS — I48.91 ATRIAL FIBRILLATION, UNSPECIFIED TYPE (H): ICD-10-CM

## 2019-11-26 DIAGNOSIS — I25.810 CORONARY ARTERY DISEASE INVOLVING AUTOLOGOUS ARTERY CORONARY BYPASS GRAFT WITHOUT ANGINA PECTORIS: ICD-10-CM

## 2019-11-26 PROCEDURE — 93306 TTE W/DOPPLER COMPLETE: CPT

## 2019-11-26 PROCEDURE — 93227 XTRNL ECG REC<48 HR R&I: CPT | Performed by: INTERNAL MEDICINE

## 2019-11-26 PROCEDURE — 93306 TTE W/DOPPLER COMPLETE: CPT | Mod: 26 | Performed by: INTERNAL MEDICINE

## 2019-11-26 PROCEDURE — 93225 XTRNL ECG REC<48 HRS REC: CPT

## 2019-11-26 NOTE — TELEPHONE ENCOUNTER
Echo completed today, results below. 24hr Holter also placed today, results pending. Tests were ordered by Dr Patel 11/7/19 for new diagnosis of afib. Dr Patel had also recommended a DCCV after 4wks of xarelto therapy but pt declined to schedule previously, wanted to complete testing first. Routed to Dr Patel for review.     Systolic and diastolic hypertension.  Left ventricular systolic function is low normal.  The visual ejection fraction is estimated at 50-55%.  There is mild concentric left ventricular hypertrophy.  The right ventricle is normal in structure, function and size.  The left atrium is severely dilated.  The right atrium is moderate to severely dilated.  No hemodynamically significant valvular abnormalities.  Mild aortic root dilatation (3.9 cm).  The ascending aorta is mildly dilated (3/7 cm).  IVC diameter and respiratory changes fall into an intermediate range  suggesting an RA pressure of 8 mmHg.  The rhythm was atrial fibrillation with controlled ventricular rate at rest.  There is no comparison study available.

## 2019-12-02 ENCOUNTER — TELEPHONE (OUTPATIENT)
Dept: CARDIOLOGY | Facility: CLINIC | Age: 74
End: 2019-12-02

## 2019-12-02 NOTE — TELEPHONE ENCOUNTER
24 hr Holter placed 11-26-19  principle rhythm was Atrial Fi brillation. During this  time his average heart rate was 89 bpm. He had a minimum heart rate of 39 bpm at 05:12:-7 (27-NOV) and a maximum heart rate of 196 bpm at 08:24:11  (27-NOV). The QRS duration measured .10 seconds, the QT interval .22-.47 seconds.  2. The longest R-R interval measured 2.67 seconds at 04:57:25 (27-NOV).  3. There were 2,161 isolated ventricular ectopics, 58 couplets and 4 triplets.    Last Dr. Patel OV 11-7-19 for new onset A Fib -  I talked to him about remaining on the atenolol 50 mg and Xarelto for now.  After 4 weeks of anticoagulation with Xarelto, elective electrical cardioversion would be reasonable. Echo also done 11-26-19  Systolic and diastolic hypertension.  Left ventricular systolic function is low normal.  The visual ejection fraction is estimated at 50-55%.  There is mild concentric left ventricular hypertrophy.  The right ventricle is normal in structure, function and size.  The left atrium is severely dilated.  The right atrium is moderate to severely dilated.  No hemodynamically significant valvular abnormalities.  Mild aortic root dilatation (3.9 cm).  The ascending aorta is mildly dilated (3/7 cm).  IVC diameter and respiratory changes fall into an intermediate range suggesting an RA pressure of 8 mmHg.  The rhythm was atrial fibrillation with controlled ventricular rate at rest.    Dr. Patel to review.

## 2019-12-02 NOTE — TELEPHONE ENCOUNTER
Please update the patient that his echocardiogram shows normal heart function.  The heart monitor confirms rate controlled atrial fibrillation.    1.  Proceed to elective cardioversion, after total anticoagulation duration of 4 weeks, if patient is agreeable.  2.  He was going to set up follow-up with us at his discretion.    Dr. LEXI Patel MD [facility]  Cardiology

## 2019-12-03 NOTE — TELEPHONE ENCOUNTER
Attempted to contact patient on both phones - no voice mail set up. Will try again    1030 spoke with patient, he would like to discuss his results with his PCP and then decide if he wants the cardioversion. Copy of holter report, echo, labs and office visit to  for .

## 2020-03-15 ENCOUNTER — HEALTH MAINTENANCE LETTER (OUTPATIENT)
Age: 75
End: 2020-03-15

## 2020-04-17 ENCOUNTER — OFFICE VISIT (OUTPATIENT)
Dept: FAMILY MEDICINE | Facility: CLINIC | Age: 75
End: 2020-04-17

## 2020-04-17 VITALS
DIASTOLIC BLOOD PRESSURE: 86 MMHG | TEMPERATURE: 98.2 F | BODY MASS INDEX: 29.88 KG/M2 | SYSTOLIC BLOOD PRESSURE: 150 MMHG | HEART RATE: 43 BPM | WEIGHT: 211.2 LBS | OXYGEN SATURATION: 96 %

## 2020-04-17 DIAGNOSIS — E78.2 MIXED HYPERLIPIDEMIA: Primary | ICD-10-CM

## 2020-04-17 DIAGNOSIS — I48.91 ATRIAL FIBRILLATION, UNSPECIFIED TYPE (H): ICD-10-CM

## 2020-04-17 DIAGNOSIS — I10 BENIGN ESSENTIAL HYPERTENSION: ICD-10-CM

## 2020-04-17 DIAGNOSIS — I25.810 CORONARY ARTERY DISEASE INVOLVING AUTOLOGOUS ARTERY CORONARY BYPASS GRAFT WITHOUT ANGINA PECTORIS: ICD-10-CM

## 2020-04-17 LAB
ALBUMIN SERPL-MCNC: 4.5 G/DL (ref 3.6–5.1)
ALBUMIN/GLOB SERPL: 1.6 {RATIO} (ref 1–2.5)
ALP SERPL-CCNC: 77 U/L (ref 33–130)
ALT 1742-6: 0 U/L (ref 0–32)
AST 1920-8: 15 U/L (ref 0–35)
BILIRUB SERPL-MCNC: 0.8 MG/DL (ref 0.2–1.2)
BUN SERPL-MCNC: 17 MG/DL (ref 7–25)
BUN/CREATININE RATIO: 13.9 (ref 6–22)
CALCIUM SERPL-MCNC: 9.8 MG/DL (ref 8.6–10.3)
CHLORIDE SERPLBLD-SCNC: 107.2 MMOL/L (ref 98–110)
CHOLEST SERPL-MCNC: 169 MG/DL (ref 0–199)
CHOLEST/HDLC SERPL: 3 {RATIO} (ref 0–5)
CO2 SERPL-SCNC: 27.9 MMOL/L (ref 20–32)
CREAT SERPL-MCNC: 1.22 MG/DL (ref 0.7–1.18)
GFR SERPL CREATININE-BSD FRML MDRD: 59 ML/MIN/1.73M2
GLOBULIN, CALCULATED - QUEST: 2.9 (ref 1.9–3.7)
GLUCOSE SERPL-MCNC: 100 MG/DL (ref 60–99)
HDLC SERPL-MCNC: 53 MG/DL (ref 40–150)
LDLC SERPL CALC-MCNC: 100 MG/DL (ref 0–130)
POTASSIUM SERPL-SCNC: 4.51 MMOL/L (ref 3.5–5.3)
PROT SERPL-MCNC: 7.4 G/DL (ref 6.1–8.1)
SODIUM SERPL-SCNC: 143.3 MMOL/L (ref 135–146)
TRIGL SERPL-MCNC: 81 MG/DL (ref 0–149)

## 2020-04-17 PROCEDURE — 80053 COMPREHEN METABOLIC PANEL: CPT | Performed by: FAMILY MEDICINE

## 2020-04-17 PROCEDURE — 80061 LIPID PANEL: CPT | Performed by: FAMILY MEDICINE

## 2020-04-17 PROCEDURE — 99214 OFFICE O/P EST MOD 30 MIN: CPT | Performed by: FAMILY MEDICINE

## 2020-04-17 PROCEDURE — 36415 COLL VENOUS BLD VENIPUNCTURE: CPT | Performed by: FAMILY MEDICINE

## 2020-04-17 RX ORDER — ATORVASTATIN CALCIUM 40 MG/1
40 TABLET, FILM COATED ORAL DAILY
Qty: 90 TABLET | Refills: 1 | Status: SHIPPED | OUTPATIENT
Start: 2020-04-17 | End: 2020-11-04

## 2020-04-17 RX ORDER — ATENOLOL 50 MG/1
50 TABLET ORAL DAILY
Qty: 90 TABLET | Refills: 1 | Status: SHIPPED | OUTPATIENT
Start: 2020-04-17 | End: 2020-11-04

## 2020-04-17 NOTE — PROGRESS NOTES
Subjective     Jose David Zhang is a 74 year old male who presents to clinic today for the following health issues:    HPI   Hyperlipidemia Follow-Up      Are you regularly taking any medication or supplement to lower your cholesterol?   Yes- atorvastatin    Are you having muscle aches or other side effects that you think could be caused by your cholesterol lowering medication?  No    Hypertension Follow-up      Do you check your blood pressure regularly outside of the clinic? No     Are you following a low salt diet? No    Are your blood pressures ever more than 140 on the top number (systolic) OR more   than 90 on the bottom number (diastolic), for example 140/90? Unsure, had been controlled    Vascular Disease Follow-up      How often do you take nitroglycerin? Never    Do you take an aspirin every day? No      How many servings of fruits and vegetables do you eat daily?  2-3    On average, how many sweetened beverages do you drink each day (Examples: soda, juice, sweet tea, etc.  Do NOT count diet or artificially sweetened beverages)?   1    How many days per week do you exercise enough to make your heart beat faster? 5    How many minutes a day do you exercise enough to make your heart beat faster? 30 - 60    How many days per week do you miss taking your medication? 0    A Fib- xarelto through [facility]    Patient Active Problem List   Diagnosis     BENIGN CORDELIA CEREBR MENINGES/meningioma     Health Care Home     Acromioclavicular joint arthritis     ACP (advance care planning)     Coronary artery disease involving autologous artery coronary bypass graft without angina pectoris     Nonintractable epilepsy without status epilepticus, unspecified epilepsy type (H)     Traumatic brain injury with loss of consciousness (H)     Atrial fibrillation (H)     Past Surgical History:   Procedure Laterality Date     C CABG, ARTERIAL, FOUR+  10/9/01    FSD     EYE EXAM ESTABLISHED PT  2003     HC COLONOSCOPY THRU STOMA, DIAGNOSTIC   2016    Patient Reported     HCL PSA, DIAGNOSTIC (TUMOR MARKER)       MOHS MICROGRAPHIC PROCEDURE  2018    DR Wiggins/ nose     SIGMOIDOSCOPY,DIAGNOSTIC      [facility]       Social History     Tobacco Use     Smoking status: Former Smoker     Packs/day: 0.25     Years: 20.00     Pack years: 5.00     Last attempt to quit:      Years since quittin.3     Smokeless tobacco: Never Used     Tobacco comment: occasional cigar   Substance Use Topics     Alcohol use: Yes     Alcohol/week: 2.5 standard drinks     Types: 3 Standard drinks or equivalent per week     Frequency: 2-3 times a week     Comment: 1L / month (avg 3 drinks/week)     Family History   Problem Relation Age of Onset     Heart Disease Father          at 59     Cancer No family hx of      Diabetes No family hx of          Current Outpatient Medications   Medication Sig Dispense Refill     atenolol (TENORMIN) 50 MG tablet Take 1 tablet (50 mg) by mouth daily 90 tablet 1     atorvastatin (LIPITOR) 40 MG tablet Take 1 tablet (40 mg) by mouth daily 90 tablet 1     Multiple Vitamin (MULTI VITAMIN MENS) TABS Take 1 tablet by mouth daily.       rivaroxaban ANTICOAGULANT (XARELTO ANTICOAGULANT) 20 MG TABS tablet Take 1 tablet (20 mg) by mouth daily (with dinner) 14 tablet 0     Allergies   Allergen Reactions     Amoxicillin GI Disturbance     Augmentin Hives and Diarrhea     Recent Labs   Lab Test 19  1153 10/31/19 04/23/19  1124 10/29/18  1035 18  0922   LDL  --  87 87 106* 88   HDL  --  52 44 51 41   TRIG  --  67 129 148 111   ALT  --   --  14 16 14   CR  --  1.21* 1.16 1.16 1.09   GFRESTIMATED  --  72 63 63 68   POTASSIUM  --  4.23 5.1 4.6 4.9   TSH 0.96  --   --   --   --       BP Readings from Last 3 Encounters:   20 (!) 150/86   19 102/72   19 131/81    Wt Readings from Last 3 Encounters:   20 95.8 kg (211 lb 3.2 oz)   19 89.2 kg (196 lb 9.6 oz)   19 89.4 kg (197 lb)                       Reviewed and updated as needed this visit by Provider         Review of Systems   ROS COMP: Constitutional, HEENT, cardiovascular, pulmonary, gi and gu systems are negative, except as otherwise noted.      Objective    BP (!) 150/86 (BP Location: Right arm, Patient Position: Sitting, Cuff Size: Adult Large)   Pulse (!) 43   Temp 98.2  F (36.8  C) (Oral)   Wt 95.8 kg (211 lb 3.2 oz)   SpO2 96%   BMI 29.88 kg/m    Body mass index is 29.88 kg/m .  Physical Exam   GENERAL: healthy, alert and no distress  EYES: Eyes grossly normal to inspection, PERRL and conjunctivae and sclerae normal  HENT: ear canals and TM's normal, nose and mouth without ulcers or lesions  NECK: no adenopathy, no asymmetry, masses, or scars and thyroid normal to palpation  RESP: lungs clear to auscultation - no rales, rhonchi or wheezes  CV: regular rate and rhythm, normal S1 S2, no S3 or S4, no murmur, click or rub, no peripheral edema and peripheral pulses strong  ABDOMEN: soft, nontender, no hepatosplenomegaly, no masses and bowel sounds normal  MS: no gross musculoskeletal defects noted, no edema  SKIN: no suspicious lesions or rashes  NEURO: Normal strength and tone, mentation intact and speech normal  PSYCH: mentation appears normal, affect normal/bright    Diagnostic Test Results:  Labs reviewed in Epic        Assessment & Plan   Assessment      Plan  (E78.2) Mixed hyperlipidemia  (primary encounter diagnosis)  Comment: control uncertain  Plan: atorvastatin (LIPITOR) 40 MG tablet, Lipid         Panel (BFP), Comprehensive Metobolic Panel         (BFP), VENOUS COLLECTION        continue current medications at current doses pending labs    (I25.810) Coronary artery disease involving autologous artery coronary bypass graft without angina pectoris  Comment: stable symptomatically   Plan: atorvastatin (LIPITOR) 40 MG tablet, Lipid         Panel (BFP), Comprehensive Metobolic Panel         (BFP), VENOUS COLLECTION        continue current  medications at current doses     (I48.91) Atrial fibrillation, unspecified type (H)  Comment: stable  Plan: atenolol (TENORMIN) 50 MG tablet        continue current medications at current doses     (I10) Benign essential hypertension  Comment: elevated today, normal at home  Plan: atenolol (TENORMIN) 50 MG tablet, Comprehensive        Metobolic Panel (BFP), VENOUS COLLECTION        continue current medications at current doses ,Check blood pressure readings outside of the clinic several times per week, write down values, and follow up if elevated within the next several weeks. Blood pressure can be checked at the firestation, drugstore,  or any valid site.     Pt also still notes left thumb locking, offer ortho eval but declines for now    FUTURE APPOINTMENTS:       - Follow-up visit in 6 mo  Work on weight loss  Regular exercise    No follow-ups on file.    Jose David Griffin MD  [facility]

## 2020-11-04 ENCOUNTER — OFFICE VISIT (OUTPATIENT)
Dept: FAMILY MEDICINE | Facility: CLINIC | Age: 75
End: 2020-11-04

## 2020-11-04 VITALS
HEART RATE: 52 BPM | TEMPERATURE: 97.2 F | SYSTOLIC BLOOD PRESSURE: 142 MMHG | WEIGHT: 207 LBS | HEIGHT: 71 IN | BODY MASS INDEX: 28.98 KG/M2 | DIASTOLIC BLOOD PRESSURE: 68 MMHG | RESPIRATION RATE: 20 BRPM

## 2020-11-04 DIAGNOSIS — I48.91 ATRIAL FIBRILLATION, UNSPECIFIED TYPE (H): ICD-10-CM

## 2020-11-04 DIAGNOSIS — I25.810 CORONARY ARTERY DISEASE INVOLVING AUTOLOGOUS ARTERY CORONARY BYPASS GRAFT WITHOUT ANGINA PECTORIS: ICD-10-CM

## 2020-11-04 DIAGNOSIS — H61.23 BILATERAL IMPACTED CERUMEN: Primary | ICD-10-CM

## 2020-11-04 DIAGNOSIS — E78.2 MIXED HYPERLIPIDEMIA: ICD-10-CM

## 2020-11-04 DIAGNOSIS — I10 BENIGN ESSENTIAL HYPERTENSION: ICD-10-CM

## 2020-11-04 LAB
ALBUMIN SERPL-MCNC: 4.5 G/DL (ref 3.6–5.1)
ALBUMIN/GLOB SERPL: 1.6 {RATIO} (ref 1–2.5)
ALP SERPL-CCNC: 76 U/L (ref 33–130)
ALT 1742-6: 4 U/L (ref 0–32)
AST 1920-8: 12 U/L (ref 0–35)
BILIRUB SERPL-MCNC: 0.7 MG/DL (ref 0.2–1.2)
BUN SERPL-MCNC: 21 MG/DL (ref 7–25)
BUN/CREATININE RATIO: 18.9 (ref 6–22)
CALCIUM SERPL-MCNC: 9.7 MG/DL (ref 8.6–10.3)
CHLORIDE SERPLBLD-SCNC: 106 MMOL/L (ref 98–110)
CHOLEST SERPL-MCNC: 191 MG/DL (ref 0–199)
CHOLEST/HDLC SERPL: 3 {RATIO} (ref 0–5)
CO2 SERPL-SCNC: 30 MMOL/L (ref 20–32)
CREAT SERPL-MCNC: 1.11 MG/DL (ref 0.7–1.18)
GLOBULIN, CALCULATED - QUEST: 2.8 (ref 1.9–3.7)
GLUCOSE SERPL-MCNC: 98 MG/DL (ref 60–99)
HDLC SERPL-MCNC: 59 MG/DL (ref 40–150)
LDLC SERPL CALC-MCNC: 11 MG/DL (ref 0–130)
POTASSIUM SERPL-SCNC: 5.22 MMOL/L (ref 3.5–5.3)
PROT SERPL-MCNC: 7.3 G/DL (ref 6.1–8.1)
SODIUM SERPL-SCNC: 142.9 MMOL/L (ref 135–146)
TRIGL SERPL-MCNC: 102 MG/DL (ref 0–149)

## 2020-11-04 PROCEDURE — 99214 OFFICE O/P EST MOD 30 MIN: CPT | Mod: 25 | Performed by: FAMILY MEDICINE

## 2020-11-04 PROCEDURE — 69210 REMOVE IMPACTED EAR WAX UNI: CPT | Mod: 50 | Performed by: FAMILY MEDICINE

## 2020-11-04 PROCEDURE — 80053 COMPREHEN METABOLIC PANEL: CPT | Performed by: FAMILY MEDICINE

## 2020-11-04 PROCEDURE — 80061 LIPID PANEL: CPT | Performed by: FAMILY MEDICINE

## 2020-11-04 PROCEDURE — 36415 COLL VENOUS BLD VENIPUNCTURE: CPT | Performed by: FAMILY MEDICINE

## 2020-11-04 RX ORDER — ATORVASTATIN CALCIUM 40 MG/1
40 TABLET, FILM COATED ORAL DAILY
Qty: 90 TABLET | Refills: 1 | Status: SHIPPED | OUTPATIENT
Start: 2020-11-04 | End: 2021-04-13

## 2020-11-04 RX ORDER — ATENOLOL 50 MG/1
50 TABLET ORAL DAILY
Qty: 90 TABLET | Refills: 1 | Status: SHIPPED | OUTPATIENT
Start: 2020-11-04 | End: 2021-04-13

## 2020-11-04 ASSESSMENT — MIFFLIN-ST. JEOR: SCORE: 1698.14

## 2020-11-04 NOTE — NURSING NOTE
Jose David Zhang is here for fasting blood work and medication refill.  Questioned patient about current smoking habits.  Pt. quit smoking some time ago.  Body mass index is 33.67 kg/(m^2).  PULSE regular  My Chart: active    Pre-visit planning  Immunizations - up to date  Colonoscopy -   Mammogram -   Asthma -   PHQ9  GRETA-7

## 2020-11-12 RX ORDER — ATORVASTATIN CALCIUM 40 MG/1
40 TABLET, FILM COATED ORAL DAILY
Qty: 6 TABLET | Refills: 0 | COMMUNITY
Start: 2020-11-12 | End: 2021-04-13

## 2020-11-12 NOTE — TELEPHONE ENCOUNTER
[facility] from [facility] is running behind. Called in 6 tablets of Atorvastatin to local [facility] per his request.

## 2021-04-13 ENCOUNTER — OFFICE VISIT (OUTPATIENT)
Dept: FAMILY MEDICINE | Facility: CLINIC | Age: 76
End: 2021-04-13

## 2021-04-13 VITALS
TEMPERATURE: 97.2 F | HEIGHT: 71 IN | RESPIRATION RATE: 20 BRPM | DIASTOLIC BLOOD PRESSURE: 72 MMHG | SYSTOLIC BLOOD PRESSURE: 132 MMHG | HEART RATE: 72 BPM | BODY MASS INDEX: 29.29 KG/M2 | WEIGHT: 209.2 LBS

## 2021-04-13 DIAGNOSIS — E78.2 MIXED HYPERLIPIDEMIA: Primary | ICD-10-CM

## 2021-04-13 DIAGNOSIS — Z71.89 ACP (ADVANCE CARE PLANNING): ICD-10-CM

## 2021-04-13 DIAGNOSIS — I25.810 CORONARY ARTERY DISEASE INVOLVING AUTOLOGOUS ARTERY CORONARY BYPASS GRAFT WITHOUT ANGINA PECTORIS: ICD-10-CM

## 2021-04-13 DIAGNOSIS — I10 BENIGN ESSENTIAL HYPERTENSION: ICD-10-CM

## 2021-04-13 DIAGNOSIS — I48.91 ATRIAL FIBRILLATION, UNSPECIFIED TYPE (H): ICD-10-CM

## 2021-04-13 LAB
ALBUMIN SERPL-MCNC: 4.6 G/DL (ref 3.6–5.1)
ALBUMIN/GLOB SERPL: 1.6 {RATIO} (ref 1–2.5)
ALP SERPL-CCNC: 82 U/L (ref 33–130)
ALT 1742-6: 11 U/L (ref 0–32)
AST 1920-8: 15 U/L (ref 0–35)
BILIRUB SERPL-MCNC: 0.7 MG/DL (ref 0.2–1.2)
BUN SERPL-MCNC: 16 MG/DL (ref 7–25)
BUN/CREATININE RATIO: 12.4 (ref 6–22)
CALCIUM SERPL-MCNC: 9.4 MG/DL (ref 8.6–10.3)
CHLORIDE SERPLBLD-SCNC: 106.9 MMOL/L (ref 98–110)
CHOLEST SERPL-MCNC: 184 MG/DL (ref 0–199)
CHOLEST/HDLC SERPL: 4 {RATIO} (ref 0–5)
CO2 SERPL-SCNC: 25.7 MMOL/L (ref 20–32)
CREAT SERPL-MCNC: 1.29 MG/DL (ref 0.6–1.3)
ERYTHROCYTE [DISTWIDTH] IN BLOOD BY AUTOMATED COUNT: 12.2 %
GLOBULIN, CALCULATED - QUEST: 2.9 (ref 1.9–3.7)
GLUCOSE SERPL-MCNC: 102 MG/DL (ref 60–99)
HCT VFR BLD AUTO: 53.9 % (ref 40–53)
HDLC SERPL-MCNC: 50 MG/DL (ref 40–150)
HEMOGLOBIN: 17.2 G/DL (ref 13.3–17.7)
LDLC SERPL CALC-MCNC: 98 MG/DL (ref 0–130)
MCH RBC QN AUTO: 29.8 PG (ref 26–33)
MCHC RBC AUTO-ENTMCNC: 31.9 G/DL (ref 31–36)
MCV RBC AUTO: 93.4 FL (ref 78–100)
PLATELET COUNT - QUEST: 284 10^9/L (ref 150–375)
POTASSIUM SERPL-SCNC: 4.98 MMOL/L (ref 3.5–5.3)
PROT SERPL-MCNC: 7.5 G/DL (ref 6.1–8.1)
RBC # BLD AUTO: 5.77 10*12/L (ref 4.4–5.9)
SODIUM SERPL-SCNC: 142.1 MMOL/L (ref 135–146)
TRIGL SERPL-MCNC: 182 MG/DL (ref 0–149)
WBC # BLD AUTO: 13.7 10*9/L (ref 4–11)

## 2021-04-13 PROCEDURE — 80053 COMPREHEN METABOLIC PANEL: CPT | Performed by: FAMILY MEDICINE

## 2021-04-13 PROCEDURE — 99214 OFFICE O/P EST MOD 30 MIN: CPT | Performed by: FAMILY MEDICINE

## 2021-04-13 PROCEDURE — 36415 COLL VENOUS BLD VENIPUNCTURE: CPT | Performed by: FAMILY MEDICINE

## 2021-04-13 PROCEDURE — 80061 LIPID PANEL: CPT | Performed by: FAMILY MEDICINE

## 2021-04-13 PROCEDURE — 85027 COMPLETE CBC AUTOMATED: CPT | Performed by: FAMILY MEDICINE

## 2021-04-13 RX ORDER — ATORVASTATIN CALCIUM 40 MG/1
40 TABLET, FILM COATED ORAL DAILY
Qty: 90 TABLET | Refills: 1 | Status: SHIPPED | OUTPATIENT
Start: 2021-04-13 | End: 2021-10-05

## 2021-04-13 RX ORDER — ATENOLOL 50 MG/1
50 TABLET ORAL DAILY
Qty: 90 TABLET | Refills: 1 | Status: SHIPPED | OUTPATIENT
Start: 2021-04-13 | End: 2021-10-05

## 2021-04-13 ASSESSMENT — MIFFLIN-ST. JEOR: SCORE: 1708.11

## 2021-04-13 NOTE — NURSING NOTE
Jose David Zhang is here for a medication check and also hemogram per [facility].    Questioned patient about current smoking habits.  Pt. quit smoking some time ago.  PULSE regular  My Chart: active  CLASSIFICATION OF OVERWEIGHT AND OBESITY BY BMI                        Obesity Class           BMI(kg/m2)  Underweight                                    < 18.5  Normal                                         18.5-24.9  Overweight                                     25.0-29.9  OBESITY                     I                  30.0-34.9                             II                 35.0-39.9  EXTREME OBESITY             III                >40                            Patient's  BMI Body mass index is 29.59 kg/m .  http://hin.nhlbi.nih.gov/menuplanner/menu.cgi  Pre-visit planning  Immunizations - up to date  Colonoscopy -   Mammogram -   Asthma -   PHQ9 -    GRETA-7 -

## 2021-04-13 NOTE — PROGRESS NOTES
"    Assessment & Plan     Mixed hyperlipidemia  Control uncertain, continue current medications at current doses pending labs  - atorvastatin (LIPITOR) 40 MG tablet  Dispense: 90 tablet; Refill: 1  - Lipid Panel (BFP)  - Comprehensive Metobolic Panel (BFP)  - VENOUS COLLECTION    Benign essential hypertension  Well controlled, continue current medications at current doses   - atenolol (TENORMIN) 50 MG tablet  Dispense: 90 tablet; Refill: 1  - Comprehensive Metobolic Panel (BFP)  - VENOUS COLLECTION    Coronary artery disease involving autologous artery coronary bypass graft without angina pectoris  stable symptomatically continue current medications at current doses   - Hemogram Platelet (BFP)  - atorvastatin (LIPITOR) 40 MG tablet  Dispense: 90 tablet; Refill: 1    Atrial fibrillation, unspecified type (H)  Well controlled, continue current medications at current doses   - atenolol (TENORMIN) 50 MG tablet  Dispense: 90 tablet; Refill: 1    ACP (advance care planning)        Review of prior external note(s) from - Outside records from [facility]  Review of the result(s) of each unique test - labs  Ordering of each unique test  Prescription drug management         BMI:   Estimated body mass index is 29.59 kg/m  as calculated from the following:    Height as of this encounter: 1.791 m (5' 10.5\").    Weight as of this encounter: 94.9 kg (209 lb 3.2 oz).       FUTURE APPOINTMENTS:       - Follow-up visit in 6 mo  Work on weight loss  Regular exercise    No follow-ups on file.    Jose David Griffin MD  [facility]    Wallace Polk is a 75 year old who presents for the following health issues     HPI     Hyperlipidemia Follow-Up      Are you regularly taking any medication or supplement to lower your cholesterol?   Yes- atorvastatin    Are you having muscle aches or other side effects that you think could be caused by your cholesterol lowering medication?  No    Hypertension Follow-up      Do you check " "your blood pressure regularly outside of the clinic? Yes     Are you following a low salt diet? No    Are your blood pressures ever more than 140 on the top number (systolic) OR more   than 90 on the bottom number (diastolic), for example 140/90? No    Vascular Disease Follow-up      How often do you take nitroglycerin? Never    Do you take an aspirin every day? No    A Fib- stable on Xarelto    How many servings of fruits and vegetables do you eat daily?  2-3    On average, how many sweetened beverages do you drink each day (Examples: soda, juice, sweet tea, etc.  Do NOT count diet or artificially sweetened beverages)?   1    How many days per week do you exercise enough to make your heart beat faster? 3 or less    How many minutes a day do you exercise enough to make your heart beat faster? 20 - 29    How many days per week do you miss taking your medication? 0        Review of Systems   Constitutional, HEENT, cardiovascular, pulmonary, gi and gu systems are negative, except as otherwise noted.      Objective    /72 (BP Location: Right arm, Patient Position: Chair, Cuff Size: Adult Large)   Pulse 72   Temp 97.2  F (36.2  C)   Resp 20   Ht 1.791 m (5' 10.5\")   Wt 94.9 kg (209 lb 3.2 oz)   BMI 29.59 kg/m    Body mass index is 29.59 kg/m .  Physical Exam   GENERAL: healthy, alert and no distress  NECK: no adenopathy, no asymmetry, masses, or scars and thyroid normal to palpation  RESP: lungs clear to auscultation - no rales, rhonchi or wheezes  CV: regular rate and rhythm, normal S1 S2, no S3 or S4, no murmur, click or rub, no peripheral edema and peripheral pulses strong  ABDOMEN: soft, nontender, no hepatosplenomegaly, no masses and bowel sounds normal  MS: no gross musculoskeletal defects noted, no edema  PSYCH: mentation appears normal, affect normal/bright    Results for orders placed or performed in visit on 04/13/21 (from the past 24 hour(s))   Hemogram Platelet (BFP)   Result Value Ref Range    " WBC 13.7 (A) 4.0 - 11 10*9/L    RBC Count 5.77 4.4 - 5.9 10*12/L    Hemoglobin 17.2 13.3 - 17.7 g/dL    Hematocrit 53.9 (A) 40.0 - 53.0 %    MCV 93.4 78 - 100 fL    MCH 29.8 26 - 33 pg    MCHC 31.9 31 - 36 g/dL    RDW 12.2 %    Platelet Count 284 150 - 375 10^9/L

## 2021-05-08 ENCOUNTER — HEALTH MAINTENANCE LETTER (OUTPATIENT)
Age: 76
End: 2021-05-08

## 2021-09-12 NOTE — PROGRESS NOTES
"Subjective     Jose David Zhang is a 75 year old male who presents to clinic today for the following health issues:    HPI         Hyperlipidemia Follow-Up      Are you regularly taking any medication or supplement to lower your cholesterol?   Yes- atorvastatin    Are you having muscle aches or other side effects that you think could be caused by your cholesterol lowering medication?  No    Hypertension Follow-up      Do you check your blood pressure regularly outside of the clinic? Yes , home readings all in normal range    Are you following a low salt diet? No    Are your blood pressures ever more than 140 on the top number (systolic) OR more   than 90 on the bottom number (diastolic), for example 140/90? No    Vascular Disease Follow-up      How often do you take nitroglycerin? Never    Do you take an aspirin every day? No    A Fib- Xarelto    How many servings of fruits and vegetables do you eat daily?  2-3    On average, how many sweetened beverages do you drink each day (Examples: soda, juice, sweet tea, etc.  Do NOT count diet or artificially sweetened beverages)?   1    How many days per week do you exercise enough to make your heart beat faster? 7    How many minutes a day do you exercise enough to make your heart beat faster? 20 - 29    How many days per week do you miss taking your medication? 0        Review of Systems   Constitutional, HEENT, cardiovascular, pulmonary, gi and gu systems are negative, except as otherwise noted.      Objective    BP (!) 142/68 (BP Location: Right arm, Patient Position: Chair, Cuff Size: Adult Regular)   Pulse 52   Temp 97.2  F (36.2  C)   Resp 20   Ht 1.791 m (5' 10.5\")   Wt 93.9 kg (207 lb)   BMI 29.28 kg/m    Body mass index is 29.28 kg/m .  Physical Exam   GENERAL: healthy, alert and no distress  EYES: Eyes grossly normal to inspection, PERRL and conjunctivae and sclerae normal  HENT: ear canals obstructed with cerumen, nose and mouth without ulcers or " lesions  NECK: no adenopathy, no asymmetry, masses, or scars and thyroid normal to palpation  RESP: lungs clear to auscultation - no rales, rhonchi or wheezes  CV: regular rate and rhythm, normal S1 S2, no S3 or S4, no murmur, click or rub, no peripheral edema and peripheral pulses strong  ABDOMEN: soft, nontender, no hepatosplenomegaly, no masses and bowel sounds normal  MS: no gross musculoskeletal defects noted, no edema  SKIN: no suspicious lesions or rashes  NEURO: Normal strength and tone, mentation intact and speech normal  PSYCH: mentation appears normal, affect normal/bright            Assessment & Plan     Mixed hyperlipidemia  Control uncertain, continue current medications at current doses   - atorvastatin (LIPITOR) 40 MG tablet  Dispense: 90 tablet; Refill: 1  - Lipid Panel (BFP)  - Comprehensive Metobolic Panel (BFP)  - VENOUS COLLECTION    Benign essential hypertension  Borderline again here, normal on all checks at home, continue current medications at current doses Check blood pressure readings outside of the clinic several times per week, write down values, and follow up if elevated within the next several weeks. Blood pressure can be checked at the [place], drugstore,  or any valid site.   - atenolol (TENORMIN) 50 MG tablet  Dispense: 90 tablet; Refill: 1  - Comprehensive Metobolic Panel (BFP)  - VENOUS COLLECTION    Coronary artery disease involving autologous artery coronary bypass graft without angina pectoris  stable symptomatically , continue current medications at current doses   - atorvastatin (LIPITOR) 40 MG tablet  Dispense: 90 tablet; Refill: 1  - Lipid Panel (BFP)  - Comprehensive Metobolic Panel (BFP)  - VENOUS COLLECTION    Atrial fibrillation, unspecified type (H)  stable symptomatically , continue current medications at current doses   - atenolol (TENORMIN) 50 MG tablet  Dispense: 90 tablet; Refill: 1    Bilateral impacted cerumen  After unsuccessful attempt at manual cerumen  "removal , assistant did irrigate ears successfully, exam within normal limits afterward. Patient given instructions to avoid Q-tips and consider OTC wax treatments such as debrox prn   - REMOVE IMPACTED CERUMEN       BMI:   Estimated body mass index is 29.28 kg/m  as calculated from the following:    Height as of this encounter: 1.791 m (5' 10.5\").    Weight as of this encounter: 93.9 kg (207 lb).                No follow-ups on file.    Jose David Griffin MD  [facility]      " MD [place]:  patient seen and evaluated with the resident.  I was present for key portions of the History & Physical, and I agree with the Impression & Plan.  MD [place]:  66 yo F, c/o abd pain.    Duration:  acute 1wk on chronic 1yr  Quality:  sharp/stabbing  Associated Sx:  +nausea  Modifiers:  POs  Context: +surgical hx cholecystectomy and hernia repair  Physical Exam:  adult F, ill-appearing, NCAT, PERRL, EOMI, neck supple, CTA B, no edema, AAOx3 ambulates w/o difficulty.    Abdomen:  +epigastric and RUQ TTP  Impression/Plan:  Adult F, with several days of progressively worsening R-sided abd pain that is significant enough to warrant imaging to r/o acute intraabdominal pathology.  Patient will be evaluated with labs, ua, CT abdomen, and treated with APAP and IVF.

## 2021-10-05 ENCOUNTER — OFFICE VISIT (OUTPATIENT)
Dept: FAMILY MEDICINE | Facility: CLINIC | Age: 76
End: 2021-10-05

## 2021-10-05 VITALS
SYSTOLIC BLOOD PRESSURE: 136 MMHG | OXYGEN SATURATION: 98 % | HEIGHT: 71 IN | TEMPERATURE: 97.8 F | RESPIRATION RATE: 20 BRPM | HEART RATE: 50 BPM | WEIGHT: 204 LBS | DIASTOLIC BLOOD PRESSURE: 80 MMHG | BODY MASS INDEX: 28.56 KG/M2

## 2021-10-05 DIAGNOSIS — E78.2 MIXED HYPERLIPIDEMIA: Primary | ICD-10-CM

## 2021-10-05 DIAGNOSIS — I25.810 CORONARY ARTERY DISEASE INVOLVING AUTOLOGOUS ARTERY CORONARY BYPASS GRAFT WITHOUT ANGINA PECTORIS: ICD-10-CM

## 2021-10-05 DIAGNOSIS — I10 BENIGN ESSENTIAL HYPERTENSION: ICD-10-CM

## 2021-10-05 DIAGNOSIS — Z12.5 SPECIAL SCREENING FOR MALIGNANT NEOPLASM OF PROSTATE: ICD-10-CM

## 2021-10-05 DIAGNOSIS — I48.91 ATRIAL FIBRILLATION, UNSPECIFIED TYPE (H): ICD-10-CM

## 2021-10-05 DIAGNOSIS — M25.511 RIGHT SHOULDER PAIN, UNSPECIFIED CHRONICITY: ICD-10-CM

## 2021-10-05 LAB
% GRANULOCYTES: 57.9 %
ALBUMIN SERPL-MCNC: 4.3 G/DL (ref 3.6–5.1)
ALBUMIN/GLOB SERPL: 1.3 {RATIO} (ref 1–2.5)
ALP SERPL-CCNC: 79 U/L (ref 33–130)
ALT 1742-6: 11 U/L (ref 0–32)
AST 1920-8: 17 U/L (ref 0–35)
BILIRUB SERPL-MCNC: 0.7 MG/DL (ref 0.2–1.2)
BUN SERPL-MCNC: 13 MG/DL (ref 7–25)
BUN/CREATININE RATIO: 12.4 (ref 6–22)
CALCIUM SERPL-MCNC: 9.8 MG/DL (ref 8.6–10.3)
CHLORIDE SERPLBLD-SCNC: 107.4 MMOL/L (ref 98–110)
CHOLEST SERPL-MCNC: 185 MG/DL (ref 0–199)
CHOLEST/HDLC SERPL: 3 {RATIO} (ref 0–5)
CO2 SERPL-SCNC: 26.1 MMOL/L (ref 20–32)
CREAT SERPL-MCNC: 1.05 MG/DL (ref 0.6–1.3)
GLOBULIN, CALCULATED - QUEST: 3.2 (ref 1.9–3.7)
GLUCOSE SERPL-MCNC: 101 MG/DL (ref 60–99)
HCT VFR BLD AUTO: 48.8 % (ref 40–53)
HDLC SERPL-MCNC: 56 MG/DL (ref 40–150)
HEMOGLOBIN: 15.6 G/DL (ref 13.3–17.7)
LDLC SERPL CALC-MCNC: 92 MG/DL (ref 0–130)
LYMPHOCYTES NFR BLD AUTO: 34.1 %
MCH RBC QN AUTO: 30.8 PG (ref 26–33)
MCHC RBC AUTO-ENTMCNC: 32 G/DL (ref 31–36)
MCV RBC AUTO: 96.5 FL (ref 78–100)
MONOCYTES NFR BLD AUTO: 8 %
PLATELET COUNT - QUEST: 286 10^9/L (ref 150–375)
POTASSIUM SERPL-SCNC: 4.95 MMOL/L (ref 3.5–5.3)
PROT SERPL-MCNC: 7.5 G/DL (ref 6.1–8.1)
RBC # BLD AUTO: 5.06 10*12/L (ref 4.4–5.9)
SODIUM SERPL-SCNC: 142.3 MMOL/L (ref 135–146)
TRIGL SERPL-MCNC: 187 MG/DL (ref 0–149)
WBC # BLD AUTO: 10.3 10*9/L (ref 4–11)

## 2021-10-05 PROCEDURE — 80061 LIPID PANEL: CPT | Performed by: FAMILY MEDICINE

## 2021-10-05 PROCEDURE — 73030 X-RAY EXAM OF SHOULDER: CPT | Mod: RT | Performed by: FAMILY MEDICINE

## 2021-10-05 PROCEDURE — 85025 COMPLETE CBC W/AUTO DIFF WBC: CPT | Performed by: FAMILY MEDICINE

## 2021-10-05 PROCEDURE — 80053 COMPREHEN METABOLIC PANEL: CPT | Performed by: FAMILY MEDICINE

## 2021-10-05 PROCEDURE — 99214 OFFICE O/P EST MOD 30 MIN: CPT | Performed by: FAMILY MEDICINE

## 2021-10-05 PROCEDURE — 36415 COLL VENOUS BLD VENIPUNCTURE: CPT | Performed by: FAMILY MEDICINE

## 2021-10-05 RX ORDER — ATENOLOL 50 MG/1
50 TABLET ORAL DAILY
Qty: 90 TABLET | Refills: 1 | Status: SHIPPED | OUTPATIENT
Start: 2021-10-05 | End: 2021-10-28

## 2021-10-05 RX ORDER — ATORVASTATIN CALCIUM 40 MG/1
40 TABLET, FILM COATED ORAL DAILY
Qty: 90 TABLET | Refills: 1 | Status: SHIPPED | OUTPATIENT
Start: 2021-10-05 | End: 2022-04-14

## 2021-10-05 ASSESSMENT — MIFFLIN-ST. JEOR: SCORE: 1679.53

## 2021-10-05 NOTE — PROGRESS NOTES
"    Assessment & Plan     Mixed hyperlipidemia  Control uncertain, continue current medications at current doses pending labs  - atorvastatin (LIPITOR) 40 MG tablet  Dispense: 90 tablet; Refill: 1  - Lipid Panel (BFP)  - Comprehensive Metobolic Panel (BFP)  - VENOUS COLLECTION    Benign essential hypertension  Well controlled, continue current medications at current doses   - atenolol (TENORMIN) 50 MG tablet  Dispense: 90 tablet; Refill: 1  - Comprehensive Metobolic Panel (BFP)  - VENOUS COLLECTION    Coronary artery disease involving autologous artery coronary bypass graft without angina pectoris  Well controlled, continue current medications at current doses   - atorvastatin (LIPITOR) 40 MG tablet  Dispense: 90 tablet; Refill: 1  - VENOUS COLLECTION    Atrial fibrillation, unspecified type (H)  Well controlled, continue current medications at current doses   - atenolol (TENORMIN) 50 MG tablet  Dispense: 90 tablet; Refill: 1  - VENOUS COLLECTION  - HEMOGRAM PLATELET DIFF (BFP)    Special screening for malignant neoplasm of prostate    - VENOUS COLLECTION  - PSA Total ([facility])    Right shoulder pain, unspecified chronicity  Impingement, Likely rotator cuff issue-no signs fracture (pt was concerned)-recommend he see Dr Morales  - X-ray rt shoulder 2 view      Review of external notes as documented elsewhere in note  Review of the result(s) of each unique test - labs, xray  Ordering of each unique test  Prescription drug management         BMI:   Estimated body mass index is 28.86 kg/m  as calculated from the following:    Height as of this encounter: 1.791 m (5' 10.5\").    Weight as of this encounter: 92.5 kg (204 lb).       FUTURE APPOINTMENTS:       - Follow-up visit in 6 mo  Regular exercise    No follow-ups on file.    Jose David Griffin MD  [facility]    Subjective   Jam is a 76 year old who presents for the following health issues     HPI     Hyperlipidemia Follow-Up      Are you regularly " "taking any medication or supplement to lower your cholesterol?   Yes- atorvastatin    Are you having muscle aches or other side effects that you think could be caused by your cholesterol lowering medication?  No    Hypertension Follow-up      Do you check your blood pressure regularly outside of the clinic? Yes     Are you following a low salt diet? No    Are your blood pressures ever more than 140 on the top number (systolic) OR more   than 90 on the bottom number (diastolic), for example 140/90? No    Vascular Disease Follow-up      How often do you take nitroglycerin? Never    Do you take an aspirin every day? No    AFib- well controlled, on Xarelto    How many servings of fruits and vegetables do you eat daily?  2-3    On average, how many sweetened beverages do you drink each day (Examples: soda, juice, sweet tea, etc.  Do NOT count diet or artificially sweetened beverages)?   1    How many days per week do you exercise enough to make your heart beat faster? 7    How many minutes a day do you exercise enough to make your heart beat faster? 20 - 29    How many days per week do you miss taking your medication? 0    Pt noting right shoulder pain for 4 months- started after he leaned hard into gate, no pain with above or behind head    Review of Systems   Constitutional, HEENT, cardiovascular, pulmonary, gi and gu systems are negative, except as otherwise noted.      Objective    /80 (BP Location: Right arm, Patient Position: Sitting, Cuff Size: Adult Large)   Pulse 50   Temp 97.8  F (36.6  C) (Temporal)   Resp 20   Ht 1.791 m (5' 10.5\")   Wt 92.5 kg (204 lb)   SpO2 98%   BMI 28.86 kg/m    Body mass index is 28.86 kg/m .  Physical Exam   GENERAL: healthy, alert and no distress  EYES: Eyes grossly normal to inspection, PERRL and conjunctivae and sclerae normal  HENT: ear canals and TM's normal, nose and mouth without ulcers or lesions  NECK: no adenopathy, no asymmetry, masses, or scars and thyroid " normal to palpation  RESP: lungs clear to auscultation - no rales, rhonchi or wheezes  CV: regular rate and rhythm, normal S1 S2, no S3 or S4, no murmur, click or rub, no peripheral edema and peripheral pulses strong  ABDOMEN: soft, nontender, no hepatosplenomegaly, no masses and bowel sounds normal  MS: no gross musculoskeletal defects noted, no edema  MS: right shoulder with decreased internal rotation, pain with crossed arm adduction and empty can sign, pos impingement signs  SKIN: no suspicious lesions or rashes  NEURO: Normal strength and tone, mentation intact and speech normal  PSYCH: mentation appears normal, affect normal/bright    Xray - Reviewed and interpreted by me.  No fx seem, DJD  Results for orders placed or performed in visit on 10/05/21 (from the past 24 hour(s))   HEMOGRAM PLATELET DIFF (BFP)   Result Value Ref Range    WBC 10.3 4.0 - 11 10*9/L    RBC Count 5.06 4.4 - 5.9 10*12/L    Hemoglobin 15.6 13.3 - 17.7 g/dL    Hematocrit 48.8 40.0 - 53.0 %    MCV 96.5 78 - 100 fL    MCH 30.8 26 - 33 pg    MCHC 32.0 31 - 36 g/dL    Platelet Count 286 150 - 375 10^9/L    % Granulocytes 57.9 %    % Lymphocytes 34.1 %    % Monocytes 8.0 %

## 2021-10-05 NOTE — NURSING NOTE
Chief Complaint   Patient presents with     Recheck Medication     fasting medication check and review     Pre-visit Screening:  Immunizations:  up to date  Colonoscopy:  is up to date  Mammogram: NA  Asthma Action Test/Plan:  NA  PHQ9:  NA  GAD7:  NA  Questioned patient about current smoking habits Pt. quit smoking some time ago.  Ok to leave detailed message on voice mail for today's visit only Yes, phone # [phone]

## 2021-10-06 LAB — ABBOTT PSA - QUEST: 1.64 NG/ML

## 2021-10-28 ENCOUNTER — OFFICE VISIT (OUTPATIENT)
Dept: FAMILY MEDICINE | Facility: CLINIC | Age: 76
End: 2021-10-28

## 2021-10-28 ENCOUNTER — MYC MEDICAL ADVICE (OUTPATIENT)
Dept: FAMILY MEDICINE | Facility: CLINIC | Age: 76
End: 2021-10-28

## 2021-10-28 ENCOUNTER — DOCUMENTATION ONLY (OUTPATIENT)
Dept: OTHER | Facility: CLINIC | Age: 76
End: 2021-10-28

## 2021-10-28 VITALS
HEIGHT: 71 IN | SYSTOLIC BLOOD PRESSURE: 132 MMHG | DIASTOLIC BLOOD PRESSURE: 80 MMHG | HEART RATE: 48 BPM | BODY MASS INDEX: 29.01 KG/M2 | WEIGHT: 207.2 LBS | OXYGEN SATURATION: 97 % | TEMPERATURE: 97.8 F

## 2021-10-28 DIAGNOSIS — H26.8 OTHER CATARACT OF BOTH EYES: ICD-10-CM

## 2021-10-28 DIAGNOSIS — I48.91 ATRIAL FIBRILLATION, UNSPECIFIED TYPE (H): ICD-10-CM

## 2021-10-28 DIAGNOSIS — S06.9X9S TRAUMATIC BRAIN INJURY WITH LOSS OF CONSCIOUSNESS, SEQUELA (H): ICD-10-CM

## 2021-10-28 DIAGNOSIS — D32.0 BENIGN NEOPLASM OF CEREBRAL MENINGES (H): ICD-10-CM

## 2021-10-28 DIAGNOSIS — R00.1 BRADYCARDIA: ICD-10-CM

## 2021-10-28 DIAGNOSIS — I25.810 CORONARY ARTERY DISEASE INVOLVING AUTOLOGOUS ARTERY CORONARY BYPASS GRAFT WITHOUT ANGINA PECTORIS: ICD-10-CM

## 2021-10-28 DIAGNOSIS — I10 BENIGN ESSENTIAL HYPERTENSION: ICD-10-CM

## 2021-10-28 DIAGNOSIS — Z01.818 PREOPERATIVE EXAMINATION: Primary | ICD-10-CM

## 2021-10-28 DIAGNOSIS — Z79.01 CHRONIC ANTICOAGULATION: ICD-10-CM

## 2021-10-28 LAB
% GRANULOCYTES: 59.2 %
BUN SERPL-MCNC: 16 MG/DL (ref 7–25)
BUN/CREATININE RATIO: 14.7 (ref 6–22)
CALCIUM SERPL-MCNC: 9.7 MG/DL (ref 8.6–10.3)
CHLORIDE SERPLBLD-SCNC: 107.2 MMOL/L (ref 98–110)
CO2 SERPL-SCNC: 26.5 MMOL/L (ref 20–32)
CREAT SERPL-MCNC: 1.09 MG/DL (ref 0.6–1.3)
GLUCOSE SERPL-MCNC: 98 MG/DL (ref 60–99)
HCT VFR BLD AUTO: 47.4 % (ref 40–53)
HEMOGLOBIN: 15.5 G/DL (ref 13.3–17.7)
LYMPHOCYTES NFR BLD AUTO: 32.8 %
MCH RBC QN AUTO: 31.7 PG (ref 26–33)
MCHC RBC AUTO-ENTMCNC: 32.7 G/DL (ref 31–36)
MCV RBC AUTO: 96.9 FL (ref 78–100)
MONOCYTES NFR BLD AUTO: 8 %
PLATELET COUNT - QUEST: 283 10^9/L (ref 150–375)
POTASSIUM SERPL-SCNC: 4.72 MMOL/L (ref 3.5–5.3)
RBC # BLD AUTO: 4.89 10*12/L (ref 4.4–5.9)
SODIUM SERPL-SCNC: 141.8 MMOL/L (ref 135–146)
WBC # BLD AUTO: 10.6 10*9/L (ref 4–11)

## 2021-10-28 PROCEDURE — 85025 COMPLETE CBC W/AUTO DIFF WBC: CPT | Performed by: FAMILY MEDICINE

## 2021-10-28 PROCEDURE — 80048 BASIC METABOLIC PNL TOTAL CA: CPT | Performed by: FAMILY MEDICINE

## 2021-10-28 PROCEDURE — 36415 COLL VENOUS BLD VENIPUNCTURE: CPT | Performed by: FAMILY MEDICINE

## 2021-10-28 PROCEDURE — 99215 OFFICE O/P EST HI 40 MIN: CPT | Mod: 25 | Performed by: FAMILY MEDICINE

## 2021-10-28 PROCEDURE — 93000 ELECTROCARDIOGRAM COMPLETE: CPT | Performed by: FAMILY MEDICINE

## 2021-10-28 RX ORDER — ATENOLOL 50 MG/1
25 TABLET ORAL DAILY
Qty: 90 TABLET | Refills: 1
Start: 2021-10-28 | End: 2022-04-14

## 2021-10-28 ASSESSMENT — MIFFLIN-ST. JEOR: SCORE: 1694.04

## 2021-10-28 NOTE — LETTER
[facility]  [address] [place] [address]  [address]  [facility] [address]  Phone: [phone]  Fax: [phone]  Primary Provider: Jose David Griffin  Pre-op Performing Provider: TYE TYSON      PREOPERATIVE EVALUATION:  Today's date: 10/28/2021    Jose David Zhang is a 76 year old male who presents for a preoperative evaluation.  ( [birth year])    Surgical Information:  Surgery/Procedure: Cataracts  Surgery Location: [facility]  Surgeon: Dr. Riedel  Surgery Date: 21 left eye and 21 right eye   Time of Surgery: TBD  Where patient plans to recover: At home with family  Fax number for surgical facility: [phone]    Type of Anesthesia Anticipated: to be determined    Assessment & Plan     The proposed surgical procedure is considered LOW risk.    Problem List Items Addressed This Visit        Nervous and Auditory    BENIGN CORDELIA CEREBR MENINGES/meningioma    Traumatic brain injury with loss of consciousness (H)       Circulatory    Coronary artery disease involving autologous artery coronary bypass graft without angina pectoris    Atrial fibrillation (H)    Relevant Medications    atenolol (TENORMIN) 50 MG tablet       Other    Chronic anticoagulation      Other Visit Diagnoses     Preoperative examination    -  Primary    Relevant Orders    VENOUS COLLECTION (Completed)    EKG 12-lead complete w/read - Clinics (Completed)    Basic Metabolic Panel (BFP) (Completed)    HEMOGRAM PLATELET DIFF (BFP) (Completed)    Other cataract of both eyes        Bradycardia        Benign essential hypertension        Relevant Medications    atenolol (TENORMIN) 50 MG tablet        1. Preoperative examination    - VENOUS COLLECTION  - EKG 12-lead complete w/read - Clinics  - Basic Metabolic Panel (BFP)  - HEMOGRAM PLATELET DIFF (BFP)    2. Other cataract of both eyes      3. Coronary artery disease involving autologous artery coronary bypass graft without angina  "pectoris  He has seen cardiology In the past, most recently in 2019. No current cardiac symptoms.    4. Atrial fibrillation, unspecified type (H)  He is on atenolol. Due to bradycardia, decrease dose to 25 mg/day. I called and discussed with his cardiologist.  - atenolol (TENORMIN) 50 MG tablet; Take 0.5 tablets (25 mg) by mouth daily  Dispense: 90 tablet; Refill: 1    5. Chronic anticoagulation  No need to stop this per preop instructions.    6. Traumatic brain injury with loss of consciousness, sequela (H)  Years ago.    7. BENIGN CORDELIA CEREBR MENINGES/meningioma  He reports having had surgery for this.    8. Bradycardia  See above.    9. Benign essential hypertension  Controlled.  - atenolol (TENORMIN) 50 MG tablet; Take 0.5 tablets (25 mg) by mouth daily  Dispense: 90 tablet; Refill: 1      Risks and Recommendations:  The patient has the following additional risks and recommendations for perioperative complications:   - No identified additional risk factors other than previously addressed    Medication Instructions:  Patient is to take all scheduled medications on the day of surgery  per surgery instructions for cataract surgery, no need to stop his xarelto prior to surgery    RECOMMENDATION:  APPROVAL GIVEN to proceed with proposed procedure, without further diagnostic evaluation.      Subjective     HPI related to upcoming procedure: here for preop for bilateral cataracts. Symptoms include blurred vision for 1- 1 1/2 years.\" Dry eyes basically.\"    1. Yes - Have you ever had a heart attack or stroke? November 2001, nothing since then.  2. Yes - Have you ever had surgery on your heart or blood vessels, such as a stent, coronary (heart) bypass, or surgery on an artery in the head, neck, heart, or legs? January 2001--quad bypass surgery.  3. No - Do you have chest pain when you are physically active?  4. Yes - Do you have a history of heart failure? In 2001  5. No - Do you currently have a cold, bronchitis, or " symptoms of other respiratory (head and chest) infections?  6. No - Do you have a cough, shortness of breath, or wheezing?  7. No - Do you or anyone in your family have a history of blood clots?  8. No - Do you or anyone in your family have a serious bleeding problem, such as long-lasting bleeding after surgeries or cuts?  9. No - Have you ever had anemia or been told to take iron pills?  10. No - Have you had any abnormal blood loss such as black, tarry or bloody stools, or abnormal vaginal bleeding?  11. No - Have you ever had a blood transfusion?  12. Yes - Are you willing to have a blood transfusion if it is medically needed before, during, or after your surgery?  13. No - Have you or anyone in your family ever had problems with anesthesia (sedation for surgery)?  14. No - Do you have sleep apnea, excessive snoring, or daytime drowsiness?   15. No - Do you have any artifical heart valves or other implanted medical devices, such as a pacemaker, defibrillator, or continuous glucose monitor?  16. No - Do you have any artifical joints?  17. No - Are you allergic to latex?  18. No - Is there any chance that you may be pregnant?    Health Care Directive:  Patient does not have a Health Care Directive or Living Will: Pt brought in copy to appt today.    Preoperative Review of :   reviewed - no record of controlled substances prescribed.      Status of Chronic Conditions:  A-FIB - Patient has a longstanding history of chronic A-fib currently on rate control. Current treatment regimen includes Rivaroxaban for stroke prevention and denies significant symptoms of lightheadedness, palpitations or dyspnea.     CAD - Patient has a longstanding history of moderate-severe CAD. Patient denies recent chest pain or NTG use, denies exercise induced dyspnea or PND. Last Stress test years, EKG today.     HYPERLIPIDEMIA - Patient has a long history of significant Hyperlipidemia requiring medication for treatment with recent good  control. Patient reports no problems or side effects with the medication.       Review of Systems  CONSTITUTIONAL: NEGATIVE for fever, chills, change in weight  INTEGUMENTARY/SKIN: NEGATIVE for worrisome rashes, moles or lesions  EYES: NEGATIVE for vision changes or irritation  ENT/MOUTH: NEGATIVE for ear, mouth and throat problems  RESP: NEGATIVE for significant cough or SOB  CV: NEGATIVE for chest pain, palpitations or peripheral edema  GI: NEGATIVE for nausea, abdominal pain, heartburn, or change in bowel habits  : NEGATIVE for frequency, dysuria, or hematuria  MUSCULOSKELETAL: NEGATIVE for significant arthralgias or myalgia  NEURO: NEGATIVE for weakness, dizziness or paresthesias  ENDOCRINE: NEGATIVE for temperature intolerance, skin/hair changes  HEME: NEGATIVE for bleeding problems  PSYCHIATRIC: NEGATIVE for changes in mood or affect    Patient Active Problem List    Diagnosis Date Noted     Chronic anticoagulation 10/28/2021     Priority: Medium     Atrial fibrillation (H) 11/05/2019     Priority: Medium     Xarelto started 10-31-19.        Traumatic brain injury with loss of consciousness (H) 04/11/2017     Priority: Medium     1969-service connected    Hx agent orange exposure as well       Coronary artery disease involving autologous artery coronary bypass graft without angina pectoris 10/13/2016     Priority: Medium     Nonintractable epilepsy without status epilepticus, unspecified epilepsy type (H) 10/13/2016     Priority: Medium     Single episode post TBI- no issues since       ACP (advance care planning) 09/29/2015     Priority: Medium                Acromioclavicular joint arthritis 09/23/2013     Priority: Medium     Health Care Home 10/24/2012     Priority: Medium       State Tier Level:  Tier 1  Status:  n/a  Care Coordinator:      See Letters for [facility] Care Plan           BENIGN CORDELIA CEREBR MENINGES/meningioma 12/01/2006     Priority: Medium     Left lateral sphenoid wing, dr kelly         Past  Medical History:   Diagnosis Date     Benign neoplasm of cerebral meninges (H)     left sphenoid wing, dr kelly /meningioma     Chronic ischemic heart disease, unspecified      EPILEPSY NOS W/O MENTN INTRACTABLE/due to tumor 2006     Fam hx-cardiovas dis NEC      Mixed hyperlipidemia      Postsurgical 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/01     Past Surgical History:   Procedure Laterality Date     C CABG, ARTERIAL, FOUR+  10/9/01    FSD     EYE EXAM ESTABLISHED PT       HCL PSA, DIAGNOSTIC (TUMOR MARKER)       MOHS MICROGRAPHIC PROCEDURE  2018    DR Wigigns/ nose     SIGMOIDOSCOPY,DIAGNOSTIC      [facility] COLONOSCOPY THRU STOMA, DIAGNOSTIC  2016    Patient Reported     Current Outpatient Medications   Medication Sig Dispense Refill     atenolol (TENORMIN) 50 MG tablet Take 0.5 tablets (25 mg) by mouth daily 90 tablet 1     atorvastatin (LIPITOR) 40 MG tablet Take 1 tablet (40 mg) by mouth daily 90 tablet 1     Multiple Vitamin (MULTI VITAMIN MENS) TABS Take 1 tablet by mouth daily.       rivaroxaban ANTICOAGULANT (XARELTO ANTICOAGULANT) 20 MG TABS tablet Take 1 tablet (20 mg) by mouth daily (with dinner) 14 tablet 0       Allergies   Allergen Reactions     Amoxicillin GI Disturbance     Augmentin Hives and Diarrhea        Social History     Tobacco Use     Smoking status: Former Smoker     Packs/day: 0.25     Years: 20.00     Pack years: 5.00     Quit date:      Years since quittin.     Smokeless tobacco: Never Used     Tobacco comment: occasional cigar   Substance Use Topics     Alcohol use: Yes     Alcohol/week: 2.5 standard drinks     Types: 3 Standard drinks or equivalent per week     Comment: 1L / month (avg 3 drinks/week)     Family History   Problem Relation Age of Onset     Heart Disease Father          at 59     Cancer No family hx of      Diabetes No family hx of      History   Drug Use No         Objective     /80 (BP Location: Right arm, Patient Position:  "Sitting, Cuff Size: Adult Large)   Pulse (!) 48   Temp 97.8  F (36.6  C) (Temporal)   Ht 1.791 m (5' 10.5\")   Wt 94 kg (207 lb 3.2 oz)   SpO2 97%   BMI 29.31 kg/m      Physical Exam    GENERAL APPEARANCE: healthy, alert and no distress     EYES: EOMI,  PERRL     HENT: ear canals and TM's normal and nose and mouth without ulcers or lesions     NECK: no adenopathy, no asymmetry, masses, or scars and thyroid normal to palpation     RESP: lungs clear to auscultation - no rales, rhonchi or wheezes     CV: regular rates and rhythm, normal S1 S2, no S3 or S4 and no murmur, click or rub     ABDOMEN:  soft, nontender, no HSM or masses and bowel sounds normal     MS: extremities normal- no gross deformities noted, no evidence of inflammation in joints, FROM in all extremities.     SKIN: no suspicious lesions or rashes     NEURO: Normal strength and tone, sensory exam grossly normal, mentation intact and speech normal     PSYCH: mentation appears normal. and affect normal/bright     LYMPHATICS: No cervical adenopathy    Recent Labs   Lab Test 10/05/21  0943 10/05/21  0000 04/13/21  1218 04/13/21  0000 11/04/20  1243   HGB 15.6  --   --  17.2  --      --   --  284  --    NA  --  142.3 142.1  --    < >   POTASSIUM  --  4.95 4.98  --    < >   CR  --  1.05 1.29  --    < >    < > = values in this interval not displayed.        Diagnostics:  Recent Results (from the past 168 hour(s))   HEMOGRAM PLATELET DIFF (BFP)    Collection Time: 10/28/21 10:13 AM   Result Value Ref Range    WBC 10.6 4.0 - 11 10*9/L    RBC Count 4.89 4.4 - 5.9 10*12/L    Hemoglobin 15.5 13.3 - 17.7 g/dL    Hematocrit 47.4 40.0 - 53.0 %    MCV 96.9 78 - 100 fL    MCH 31.7 26 - 33 pg    MCHC 32.7 31 - 36 g/dL    Platelet Count 283 150 - 375 10^9/L    % Granulocytes 59.2 %    % Lymphocytes 32.8 %    % Monocytes 8.0 %   Basic Metabolic Panel (BFP)    Collection Time: 10/28/21  1:23 PM   Result Value Ref Range    Carbon Dioxide 26.5 20 - 32 mmol/L    " Creatinine 1.09 0.60 - 1.30 mg/dL    Glucose 98 60 - 99 mg/dL    Sodium 141.8 135 - 146 mmol/L    Potassium 4.72 3.5 - 5.3 mmol/L    Chloride 107.2 98 - 110 mmol/L    Urea Nitrogen 16 7 - 25 mg/dL    Calcium 9.7 8.6 - 10.3 mg/dL    BUN/Creatinine Ratio 14.7 6 - 22      EKG: sinus bradycardia    Revised Cardiac Risk Index (RCRI):  The patient has the following serious cardiovascular risks for perioperative complications:   - No serious cardiac risks = 0 points     RCRI Interpretation: 0 points: Class I (very low risk - 0.4% complication rate)           Signed Electronically by: Aure Mathew MD  Copy of this evaluation report is provided to requesting physician.

## 2021-10-28 NOTE — PROGRESS NOTES
[facility] PHYSICIANS  [address] [place] [address]  [address]  [facility] [address]  Phone: [phone]  Fax: [phone]  Primary Provider: Jose David Griffin  Pre-op Performing Provider: TYE TYSON      PREOPERATIVE EVALUATION:  Today's date: 10/28/2021    Jose David Zhang is a 76 year old male who presents for a preoperative evaluation.  ( [birth year])    Surgical Information:  Surgery/Procedure: Cataracts  Surgery Location: [facility]  Surgeon: Dr. Riedel  Surgery Date: 21 left eye and 21 right eye   Time of Surgery: TBD  Where patient plans to recover: At home with family  Fax number for surgical facility: [phone]    Type of Anesthesia Anticipated: to be determined    Assessment & Plan     The proposed surgical procedure is considered LOW risk.    Problem List Items Addressed This Visit        Nervous and Auditory    BENIGN CORDELIA CEREBR MENINGES/meningioma    Traumatic brain injury with loss of consciousness (H)       Circulatory    Coronary artery disease involving autologous artery coronary bypass graft without angina pectoris    Atrial fibrillation (H)    Relevant Medications    atenolol (TENORMIN) 50 MG tablet       Other    Chronic anticoagulation      Other Visit Diagnoses     Preoperative examination    -  Primary    Relevant Orders    VENOUS COLLECTION (Completed)    EKG 12-lead complete w/read - Clinics (Completed)    Basic Metabolic Panel (BFP) (Completed)    HEMOGRAM PLATELET DIFF (BFP) (Completed)    Other cataract of both eyes        Bradycardia        Benign essential hypertension        Relevant Medications    atenolol (TENORMIN) 50 MG tablet        1. Preoperative examination    - VENOUS COLLECTION  - EKG 12-lead complete w/read - Clinics  - Basic Metabolic Panel (BFP)  - HEMOGRAM PLATELET DIFF (BFP)    2. Other cataract of both eyes      3. Coronary artery disease involving autologous artery coronary bypass graft without angina  "pectoris  He has seen cardiology In the past, most recently in 2019. No current cardiac symptoms.    4. Atrial fibrillation, unspecified type (H)  He is on atenolol. Due to bradycardia, decrease dose to 25 mg/day. I called and discussed with his cardiologist.  - atenolol (TENORMIN) 50 MG tablet; Take 0.5 tablets (25 mg) by mouth daily  Dispense: 90 tablet; Refill: 1    5. Chronic anticoagulation  No need to stop this per preop instructions.    6. Traumatic brain injury with loss of consciousness, sequela (H)  Years ago.    7. BENIGN CORDELIA CEREBR MENINGES/meningioma  He reports having had surgery for this.    8. Bradycardia  See above.    9. Benign essential hypertension  Controlled.  - atenolol (TENORMIN) 50 MG tablet; Take 0.5 tablets (25 mg) by mouth daily  Dispense: 90 tablet; Refill: 1      Risks and Recommendations:  The patient has the following additional risks and recommendations for perioperative complications:   - No identified additional risk factors other than previously addressed    Medication Instructions:  Patient is to take all scheduled medications on the day of surgery  per surgery instructions for cataract surgery, no need to stop his xarelto prior to surgery    RECOMMENDATION:  APPROVAL GIVEN to proceed with proposed procedure, without further diagnostic evaluation.      Subjective     HPI related to upcoming procedure: here for preop for bilateral cataracts. Symptoms include blurred vision for 1- 1 1/2 years.\" Dry eyes basically.\"    1. Yes - Have you ever had a heart attack or stroke? November 2001, nothing since then.  2. Yes - Have you ever had surgery on your heart or blood vessels, such as a stent, coronary (heart) bypass, or surgery on an artery in the head, neck, heart, or legs? January 2001--quad bypass surgery.  3. No - Do you have chest pain when you are physically active?  4. Yes - Do you have a history of heart failure? In 2001  5. No - Do you currently have a cold, bronchitis, or " symptoms of other respiratory (head and chest) infections?  6. No - Do you have a cough, shortness of breath, or wheezing?  7. No - Do you or anyone in your family have a history of blood clots?  8. No - Do you or anyone in your family have a serious bleeding problem, such as long-lasting bleeding after surgeries or cuts?  9. No - Have you ever had anemia or been told to take iron pills?  10. No - Have you had any abnormal blood loss such as black, tarry or bloody stools, or abnormal vaginal bleeding?  11. No - Have you ever had a blood transfusion?  12. Yes - Are you willing to have a blood transfusion if it is medically needed before, during, or after your surgery?  13. No - Have you or anyone in your family ever had problems with anesthesia (sedation for surgery)?  14. No - Do you have sleep apnea, excessive snoring, or daytime drowsiness?   15. No - Do you have any artifical heart valves or other implanted medical devices, such as a pacemaker, defibrillator, or continuous glucose monitor?  16. No - Do you have any artifical joints?  17. No - Are you allergic to latex?  18. No - Is there any chance that you may be pregnant?    Health Care Directive:  Patient does not have a Health Care Directive or Living Will: Pt brought in copy to appt today.    Preoperative Review of :   reviewed - no record of controlled substances prescribed.      Status of Chronic Conditions:  A-FIB - Patient has a longstanding history of chronic A-fib currently on rate control. Current treatment regimen includes Rivaroxaban for stroke prevention and denies significant symptoms of lightheadedness, palpitations or dyspnea.     CAD - Patient has a longstanding history of moderate-severe CAD. Patient denies recent chest pain or NTG use, denies exercise induced dyspnea or PND. Last Stress test years, EKG today.     HYPERLIPIDEMIA - Patient has a long history of significant Hyperlipidemia requiring medication for treatment with recent good  control. Patient reports no problems or side effects with the medication.       Review of Systems  CONSTITUTIONAL: NEGATIVE for fever, chills, change in weight  INTEGUMENTARY/SKIN: NEGATIVE for worrisome rashes, moles or lesions  EYES: NEGATIVE for vision changes or irritation  ENT/MOUTH: NEGATIVE for ear, mouth and throat problems  RESP: NEGATIVE for significant cough or SOB  CV: NEGATIVE for chest pain, palpitations or peripheral edema  GI: NEGATIVE for nausea, abdominal pain, heartburn, or change in bowel habits  : NEGATIVE for frequency, dysuria, or hematuria  MUSCULOSKELETAL: NEGATIVE for significant arthralgias or myalgia  NEURO: NEGATIVE for weakness, dizziness or paresthesias  ENDOCRINE: NEGATIVE for temperature intolerance, skin/hair changes  HEME: NEGATIVE for bleeding problems  PSYCHIATRIC: NEGATIVE for changes in mood or affect    Patient Active Problem List    Diagnosis Date Noted     Chronic anticoagulation 10/28/2021     Priority: Medium     Atrial fibrillation (H) 11/05/2019     Priority: Medium     Xarelto started 10-31-19.        Traumatic brain injury with loss of consciousness (H) 04/11/2017     Priority: Medium     1969-service connected    Hx agent orange exposure as well       Coronary artery disease involving autologous artery coronary bypass graft without angina pectoris 10/13/2016     Priority: Medium     Nonintractable epilepsy without status epilepticus, unspecified epilepsy type (H) 10/13/2016     Priority: Medium     Single episode post TBI- no issues since       ACP (advance care planning) 09/29/2015     Priority: Medium                Acromioclavicular joint arthritis 09/23/2013     Priority: Medium     Health Care Home 10/24/2012     Priority: Medium       State Tier Level:  Tier 1  Status:  n/a  Care Coordinator:      See Letters for [facility] Care Plan           BENIGN CORDELIA CEREBR MENINGES/meningioma 12/01/2006     Priority: Medium     Left lateral sphenoid wing, dr kelly         Past  Medical History:   Diagnosis Date     Benign neoplasm of cerebral meninges (H)     left sphenoid wing, dr kelly /meningioma     Chronic ischemic heart disease, unspecified      EPILEPSY NOS W/O MENTN INTRACTABLE/due to tumor 2006     Fam hx-cardiovas dis NEC      Mixed hyperlipidemia      Postsurgical 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/01     Past Surgical History:   Procedure Laterality Date     C CABG, ARTERIAL, FOUR+  10/9/01    FSD     EYE EXAM ESTABLISHED PT       HCL PSA, DIAGNOSTIC (TUMOR MARKER)       MOHS MICROGRAPHIC PROCEDURE  2018    DR Wiggins/ nose     SIGMOIDOSCOPY,DIAGNOSTIC      [facility] COLONOSCOPY THRU STOMA, DIAGNOSTIC  2016    Patient Reported     Current Outpatient Medications   Medication Sig Dispense Refill     atenolol (TENORMIN) 50 MG tablet Take 0.5 tablets (25 mg) by mouth daily 90 tablet 1     atorvastatin (LIPITOR) 40 MG tablet Take 1 tablet (40 mg) by mouth daily 90 tablet 1     Multiple Vitamin (MULTI VITAMIN MENS) TABS Take 1 tablet by mouth daily.       rivaroxaban ANTICOAGULANT (XARELTO ANTICOAGULANT) 20 MG TABS tablet Take 1 tablet (20 mg) by mouth daily (with dinner) 14 tablet 0       Allergies   Allergen Reactions     Amoxicillin GI Disturbance     Augmentin Hives and Diarrhea        Social History     Tobacco Use     Smoking status: Former Smoker     Packs/day: 0.25     Years: 20.00     Pack years: 5.00     Quit date:      Years since quittin.     Smokeless tobacco: Never Used     Tobacco comment: occasional cigar   Substance Use Topics     Alcohol use: Yes     Alcohol/week: 2.5 standard drinks     Types: 3 Standard drinks or equivalent per week     Comment: 1L / month (avg 3 drinks/week)     Family History   Problem Relation Age of Onset     Heart Disease Father          at 59     Cancer No family hx of      Diabetes No family hx of      History   Drug Use No         Objective     /80 (BP Location: Right arm, Patient Position:  "Sitting, Cuff Size: Adult Large)   Pulse (!) 48   Temp 97.8  F (36.6  C) (Temporal)   Ht 1.791 m (5' 10.5\")   Wt 94 kg (207 lb 3.2 oz)   SpO2 97%   BMI 29.31 kg/m      Physical Exam    GENERAL APPEARANCE: healthy, alert and no distress     EYES: EOMI,  PERRL     HENT: ear canals and TM's normal and nose and mouth without ulcers or lesions     NECK: no adenopathy, no asymmetry, masses, or scars and thyroid normal to palpation     RESP: lungs clear to auscultation - no rales, rhonchi or wheezes     CV: regular rates and rhythm, normal S1 S2, no S3 or S4 and no murmur, click or rub     ABDOMEN:  soft, nontender, no HSM or masses and bowel sounds normal     MS: extremities normal- no gross deformities noted, no evidence of inflammation in joints, FROM in all extremities.     SKIN: no suspicious lesions or rashes     NEURO: Normal strength and tone, sensory exam grossly normal, mentation intact and speech normal     PSYCH: mentation appears normal. and affect normal/bright     LYMPHATICS: No cervical adenopathy    Recent Labs   Lab Test 10/05/21  0943 10/05/21  0000 04/13/21  1218 04/13/21  0000 11/04/20  1243   HGB 15.6  --   --  17.2  --      --   --  284  --    NA  --  142.3 142.1  --    < >   POTASSIUM  --  4.95 4.98  --    < >   CR  --  1.05 1.29  --    < >    < > = values in this interval not displayed.        Diagnostics:  Recent Results (from the past 168 hour(s))   HEMOGRAM PLATELET DIFF (BFP)    Collection Time: 10/28/21 10:13 AM   Result Value Ref Range    WBC 10.6 4.0 - 11 10*9/L    RBC Count 4.89 4.4 - 5.9 10*12/L    Hemoglobin 15.5 13.3 - 17.7 g/dL    Hematocrit 47.4 40.0 - 53.0 %    MCV 96.9 78 - 100 fL    MCH 31.7 26 - 33 pg    MCHC 32.7 31 - 36 g/dL    Platelet Count 283 150 - 375 10^9/L    % Granulocytes 59.2 %    % Lymphocytes 32.8 %    % Monocytes 8.0 %   Basic Metabolic Panel (BFP)    Collection Time: 10/28/21  1:23 PM   Result Value Ref Range    Carbon Dioxide 26.5 20 - 32 mmol/L    " Creatinine 1.09 0.60 - 1.30 mg/dL    Glucose 98 60 - 99 mg/dL    Sodium 141.8 135 - 146 mmol/L    Potassium 4.72 3.5 - 5.3 mmol/L    Chloride 107.2 98 - 110 mmol/L    Urea Nitrogen 16 7 - 25 mg/dL    Calcium 9.7 8.6 - 10.3 mg/dL    BUN/Creatinine Ratio 14.7 6 - 22      EKG: sinus bradycardia    Revised Cardiac Risk Index (RCRI):  The patient has the following serious cardiovascular risks for perioperative complications:   - No serious cardiac risks = 0 points     RCRI Interpretation: 0 points: Class I (very low risk - 0.4% complication rate)           Signed Electronically by: Aure Mathew MD  Copy of this evaluation report is provided to requesting physician.

## 2021-10-28 NOTE — NURSING NOTE
Chief Complaint   Patient presents with     Pre-Op Exam     cataracts 11/05/21 then 11/19/21     Pre-visit Screening:  Immunizations:  up to date  Colonoscopy:  is up to date  Mammogram: NA  Asthma Action Test/Plan:  NA  PHQ9:  NA  GAD7:  NA  Questioned patient about current smoking habits Pt. quit smoking some time ago.  Ok to leave detailed message on voice mail for today's visit only Yes, phone # [phone]

## 2022-04-14 ENCOUNTER — OFFICE VISIT (OUTPATIENT)
Dept: FAMILY MEDICINE | Facility: CLINIC | Age: 77
End: 2022-04-14

## 2022-04-14 VITALS
TEMPERATURE: 97.1 F | SYSTOLIC BLOOD PRESSURE: 126 MMHG | HEIGHT: 71 IN | BODY MASS INDEX: 27.38 KG/M2 | HEART RATE: 68 BPM | DIASTOLIC BLOOD PRESSURE: 84 MMHG | RESPIRATION RATE: 20 BRPM | WEIGHT: 195.6 LBS

## 2022-04-14 DIAGNOSIS — E78.2 MIXED HYPERLIPIDEMIA: Primary | ICD-10-CM

## 2022-04-14 DIAGNOSIS — I10 BENIGN ESSENTIAL HYPERTENSION: ICD-10-CM

## 2022-04-14 DIAGNOSIS — I48.91 ATRIAL FIBRILLATION, UNSPECIFIED TYPE (H): ICD-10-CM

## 2022-04-14 DIAGNOSIS — I25.810 CORONARY ARTERY DISEASE INVOLVING AUTOLOGOUS ARTERY CORONARY BYPASS GRAFT WITHOUT ANGINA PECTORIS: ICD-10-CM

## 2022-04-14 LAB
ALBUMIN SERPL-MCNC: 4.6 G/DL (ref 3.6–5.1)
ALBUMIN/GLOB SERPL: 1.4 {RATIO} (ref 1–2.5)
ALP SERPL-CCNC: 86 U/L (ref 33–130)
ALT 1742-6: 12 U/L (ref 0–32)
AST 1920-8: 13 U/L (ref 0–35)
BILIRUB SERPL-MCNC: 0.8 MG/DL (ref 0.2–1.2)
BUN SERPL-MCNC: 18 MG/DL (ref 7–25)
BUN/CREATININE RATIO: 13.3 (ref 6–22)
CALCIUM SERPL-MCNC: 9.2 MG/DL (ref 8.6–10.3)
CHLORIDE SERPLBLD-SCNC: 104.4 MMOL/L (ref 98–110)
CHOLEST SERPL-MCNC: 124 MG/DL (ref 0–199)
CHOLEST/HDLC SERPL: 2 {RATIO} (ref 0–5)
CO2 SERPL-SCNC: 24.9 MMOL/L (ref 20–32)
CREAT SERPL-MCNC: 1.34 MG/DL (ref 0.6–1.3)
GFR SERPL CREATININE-BSD FRML MDRD: 56 ML/MIN/1.73M2
GLOBULIN, CALCULATED - QUEST: 3.2 (ref 1.9–3.7)
GLUCOSE SERPL-MCNC: 93 MG/DL (ref 60–99)
HDLC SERPL-MCNC: 52 MG/DL (ref 40–150)
LDLC SERPL CALC-MCNC: 54 MG/DL (ref 0–130)
POTASSIUM SERPL-SCNC: 4.93 MMOL/L (ref 3.5–5.3)
PROT SERPL-MCNC: 7.8 G/DL (ref 6.1–8.1)
SODIUM SERPL-SCNC: 139.7 MMOL/L (ref 135–146)
TRIGL SERPL-MCNC: 92 MG/DL (ref 0–149)

## 2022-04-14 PROCEDURE — 36415 COLL VENOUS BLD VENIPUNCTURE: CPT | Performed by: FAMILY MEDICINE

## 2022-04-14 PROCEDURE — 99214 OFFICE O/P EST MOD 30 MIN: CPT | Performed by: FAMILY MEDICINE

## 2022-04-14 PROCEDURE — 80053 COMPREHEN METABOLIC PANEL: CPT | Performed by: FAMILY MEDICINE

## 2022-04-14 PROCEDURE — 80061 LIPID PANEL: CPT | Performed by: FAMILY MEDICINE

## 2022-04-14 RX ORDER — ATORVASTATIN CALCIUM 40 MG/1
40 TABLET, FILM COATED ORAL DAILY
Qty: 90 TABLET | Refills: 1 | Status: SHIPPED | OUTPATIENT
Start: 2022-04-14 | End: 2022-10-24

## 2022-04-14 RX ORDER — ATENOLOL 25 MG/1
25 TABLET ORAL DAILY
Qty: 90 TABLET | Refills: 1 | Status: SHIPPED | OUTPATIENT
Start: 2022-04-14 | End: 2022-10-24

## 2022-04-14 NOTE — NURSING NOTE
Jose David Zhang is here for fasting blood work and medication refill.  Pt would like atenolol 25mg, too hard to cut pill in half and crumbles    Questioned patient about current smoking habits.  Pt. recently quit smoking.  Body mass index is 33.67 kg/(m^2).  PULSE regular  My Chart: active    Pre-visit planning  Immunizations - up to date  Colonoscopy -   Mammogram -   Asthma -   PHQ9  GRETA-7

## 2022-04-14 NOTE — PROGRESS NOTES
"  Assessment & Plan     Mixed hyperlipidemia  Control uncertain, continue current medications at current doses pending labs    Benign essential hypertension  Well controlled, continue current medications at current doses     Coronary artery disease involving autologous artery coronary bypass graft without angina pectoris  stable symptomatically continue current medications at current doses     Atrial fibrillation, unspecified type (H)  Well controlled, continue current medications at current doses       Review of the result(s) of each unique test - labs  Ordering of each unique test  Prescription drug management         BMI:   Estimated body mass index is 27.67 kg/m  as calculated from the following:    Height as of this encounter: 1.791 m (5' 10.5\").    Weight as of this encounter: 88.7 kg (195 lb 9.6 oz).       FUTURE APPOINTMENTS:       - Follow-up visit in 6 mo  Regular exercise    No follow-ups on file.    Jose David Griffin MD  [facility] PHYSICIANS    Subjective   Jam is a 76 year old who presents for the following health issues     HPI     Hyperlipidemia Follow-Up      Are you regularly taking any medication or supplement to lower your cholesterol?   Yes- atorvastatin    Are you having muscle aches or other side effects that you think could be caused by your cholesterol lowering medication?  No    Hypertension Follow-up      Do you check your blood pressure regularly outside of the clinic? Yes     Are you following a low salt diet? No    Are your blood pressures ever more than 140 on the top number (systolic) OR more   than 90 on the bottom number (diastolic), for example 140/90? No    Vascular Disease [id number] CABG      How often do you take nitroglycerin? Never    Do you take an aspirin every day? No    A Fib- on Xarelto, takes daily, no side effects     How many servings of fruits and vegetables do you eat daily?  0-1    On average, how many sweetened beverages do you drink each day " "(Examples: soda, juice, sweet tea, etc.  Do NOT count diet or artificially sweetened beverages)?   1    How many days per week do you exercise enough to make your heart beat faster? 7    How many minutes a day do you exercise enough to make your heart beat faster? 20 - 29    How many days per week do you miss taking your medication? 0        Review of Systems   Constitutional, HEENT, cardiovascular, pulmonary, gi and gu systems are negative, except as otherwise noted.      Objective    /84 (BP Location: Right arm, Patient Position: Chair, Cuff Size: Adult Regular)   Pulse 68   Temp 97.1  F (36.2  C)   Resp 20   Ht 1.791 m (5' 10.5\")   Wt 88.7 kg (195 lb 9.6 oz)   BMI 27.67 kg/m    Body mass index is 27.67 kg/m .  Physical Exam   GENERAL: healthy, alert and no distress  EYES: Eyes grossly normal to inspection, PERRL and conjunctivae and sclerae normal  HENT: ear canals and TM's normal, nose and mouth without ulcers or lesions  NECK: no adenopathy, no asymmetry, masses, or scars and thyroid normal to palpation  RESP: lungs clear to auscultation - no rales, rhonchi or wheezes  CV: regular rate and rhythm, normal S1 S2, no S3 or S4, no murmur, click or rub, no peripheral edema and peripheral pulses strong  ABDOMEN: soft, nontender, no hepatosplenomegaly, no masses and bowel sounds normal  MS: no gross musculoskeletal defects noted, no edema  PSYCH: mentation appears normal, affect normal/bright    Office Visit on 10/28/2021   Component Date Value Ref Range Status     Carbon Dioxide 10/28/2021 26.5  20 - 32 mmol/L Final     Creatinine 10/28/2021 1.09  0.60 - 1.30 mg/dL Final     Glucose 10/28/2021 98  60 - 99 mg/dL Final     Sodium 10/28/2021 141.8  135 - 146 mmol/L Final     Potassium 10/28/2021 4.72  3.5 - 5.3 mmol/L Final     Chloride 10/28/2021 107.2  98 - 110 mmol/L Final     Urea Nitrogen 10/28/2021 16  7 - 25 mg/dL Final     Calcium 10/28/2021 9.7  8.6 - 10.3 mg/dL Final     BUN/Creatinine Ratio " 10/28/2021 14.7  6 - 22 Final     WBC 10/28/2021 10.6  4.0 - 11 10*9/L Final     RBC Count 10/28/2021 4.89  4.4 - 5.9 10*12/L Final     Hemoglobin 10/28/2021 15.5  13.3 - 17.7 g/dL Final     Hematocrit 10/28/2021 47.4  40.0 - 53.0 % Final     MCV 10/28/2021 96.9  78 - 100 fL Final     MCH 10/28/2021 31.7  26 - 33 pg Final     MCHC 10/28/2021 32.7  31 - 36 g/dL Final     Platelet Count 10/28/2021 283  150 - 375 10^9/L Final     % Granulocytes 10/28/2021 59.2  % Final     % Lymphocytes 10/28/2021 32.8  % Final     % Monocytes 10/28/2021 8.0  % Final

## 2022-06-04 ENCOUNTER — HEALTH MAINTENANCE LETTER (OUTPATIENT)
Age: 77
End: 2022-06-04

## 2022-06-21 ENCOUNTER — OFFICE VISIT (OUTPATIENT)
Dept: FAMILY MEDICINE | Facility: CLINIC | Age: 77
End: 2022-06-21

## 2022-06-21 VITALS
BODY MASS INDEX: 27.8 KG/M2 | TEMPERATURE: 97.7 F | SYSTOLIC BLOOD PRESSURE: 132 MMHG | HEIGHT: 70 IN | DIASTOLIC BLOOD PRESSURE: 70 MMHG | WEIGHT: 194.2 LBS | RESPIRATION RATE: 20 BRPM
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
## 2022-06-21 DIAGNOSIS — S80.862A TICK BITE OF LEFT LOWER LEG, INITIAL ENCOUNTER: Primary | ICD-10-CM

## 2022-06-21 PROCEDURE — 99213 OFFICE O/P EST LOW 20 MIN: CPT | Performed by: FAMILY MEDICINE

## 2022-06-21 RX ORDER — CEPHALEXIN 500 MG/1
500 CAPSULE ORAL 3 TIMES DAILY
Qty: 21 CAPSULE | Refills: 0 | Status: SHIPPED | OUTPATIENT
Start: 2022-06-21 | End: 2023-04-20

## 2022-06-21 NOTE — PROGRESS NOTES
SUBJECTIVE: Jose David Zhang is a 76 year old male who presents for tick bite 1 week ago, left lower leg.  -he thinks tick was on him less than a day. NO fevers or chills, no joint aches.  Pt concerned by some slightly increased redness near bite site a few days ago but now decreasing again in last day-has been using topical abx    Patient Active Problem List   Diagnosis     BENIGN CORDELIA CEREBR MENINGES/meningioma     Health Care Home     Acromioclavicular joint arthritis     ACP (advance care planning)     Coronary artery disease involving autologous artery coronary bypass graft without angina pectoris     Nonintractable epilepsy without status epilepticus, unspecified epilepsy type (H)     Traumatic brain injury with loss of consciousness (H)     Atrial fibrillation (H)     Chronic anticoagulation     Past Medical History:   Diagnosis Date     Benign neoplasm of cerebral meninges (H)     left sphenoid wing, dr kelly /meningioma     Chronic ischemic heart disease, unspecified      EPILEPSY NOS W/O MENTN INTRACTABLE/due to tumor 2006     Fam hx-cardiovas dis NEC      Mixed hyperlipidemia      Postsurgical 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/01     Family History   Problem Relation Age of Onset     Heart Disease Father          at 59     Cancer No family hx of      Diabetes No family hx of      Social History     Socioeconomic History     Marital status:      Spouse name: Lilian     Number of children: 2     Years of education: 16     Highest education level: Not on file   Occupational History     Occupation: Retired     Comment: Insurance, Banking   Tobacco Use     Smoking status: Former Smoker     Packs/day: 0.25     Years: 20.00     Pack years: 5.00     Quit date:      Years since quittin.4     Smokeless tobacco: Never Used     Tobacco comment: occasional cigar   Substance and Sexual Activity     Alcohol use: Yes     Alcohol/week: 1.0 standard drink     Types: 1 Standard drinks or equivalent per  week     Comment: rare     Drug use: No     Sexual activity: Never     Partners: Female   Other Topics Concern      Service Not Asked     Blood Transfusions Not Asked     Caffeine Concern Not Asked     Occupational Exposure Not Asked     Hobby Hazards Not Asked     Sleep Concern Not Asked     Stress Concern Not Asked     Weight Concern Not Asked     Special Diet Not Asked     Back Care Not Asked     Exercise Yes     Bike Helmet Not Asked     Seat Belt Yes     Self-Exams Yes     Parent/sibling w/ CABG, MI or angioplasty before 65F 55M? Yes   Social History Narrative     Not on file     Social Determinants of Health     Financial Resource Strain: Not on file   Food Insecurity: Not on file   Transportation Needs: Not on file   Physical Activity: Not on file   Stress: Not on file   Social Connections: Not on file   Intimate Partner Violence: Not on file   Housing Stability: Not on file     Past Surgical History:   Procedure Laterality Date     EYE EXAM ESTABLISHED PT  2003     HCL PSA, DIAGNOSTIC (TUMOR MARKER)  2001     MOHS MICROGRAPHIC PROCEDURE  12/13/2018    DR Wiggins/ nose     SIGMOIDOSCOPY,DIAGNOSTIC  1994    [facility]     Z CABG, ARTERIAL, FOUR+  10/9/01    [facility]     [facility]  [facility]    Patient Reported     atenolol (TENORMIN) 25 MG tablet, Take 1 tablet (25 mg) by mouth daily  atorvastatin (LIPITOR) 40 MG tablet, Take 1 tablet (40 mg) by mouth daily  Multiple Vitamin (MULTI VITAMIN MENS) TABS, Take 1 tablet by mouth daily.  rivaroxaban ANTICOAGULANT (XARELTO ANTICOAGULANT) 20 MG TABS tablet, Take 1 tablet (20 mg) by mouth daily (with dinner)    No current facility-administered medications on file prior to visit.       Allergies: Amoxicillin and Augmentin    Immunization History   Administered Date(s) Administered     COVID-19,PF,Moderna 03/10/2021, 04/07/2021, 12/12/2021     Flu 65+ Years 10/08/2019     Influenza (High Dose) 3 valent vaccine 10/01/2014, 10/05/2016, 09/25/2017,  "09/20/2018     Influenza (IIV3) PF 11/04/2008, 09/28/2011, 09/20/2012, 11/09/2013     Influenza Vaccine IM > 6 months Valent IIV4 (Alfuria,Fluzone) 09/29/2015     Influenza, Quad, High Dose, Pf, 65yr+ (Fluzone HD) 08/31/2020, 09/21/2021     Pneumo Conj 13-V (2010&after) 10/05/2016     Pneumococcal 23 valent 10/12/2017     TD (ADULT, 7+) 01/01/2001, 06/04/2019     TDAP Vaccine (Boostrix) 04/15/2009    /70 (BP Location: Right arm, Patient Position: Chair, Cuff Size: Adult Regular)   Temp 97.7  F (36.5  C) (Temporal)   Resp 20   Ht 1.778 m (5' 10\")   Wt 88.1 kg (194 lb 3.2 oz)   BMI 27.86 kg/m       Skin:left posterior calf with bite phil, 1-2 cm erythematous [place] surrounding, no streaking, no tick remnants left    ASSESSMENT: /PLAN:   (S80.862A,  W57.XXXA) Tick bite of left lower leg, initial encounter  (primary encounter diagnosis)  Comment: no risk for lyme if removed within hours of exposure-no clear signs cellulitis but may progress, [place] going up to cabin so we agree to give rx for cellulitis to fill only if redness increases  Plan: as above     "

## 2022-06-21 NOTE — NURSING NOTE
Jose David Zhang is here for a deer tick bite last week on lower left leg.    Questioned patient about current smoking habits.  Pt. quit smoking some time ago.  PULSE regular  My Chart: active  CLASSIFICATION OF OVERWEIGHT AND OBESITY BY BMI                        Obesity Class           BMI(kg/m2)  Underweight                                    < 18.5  Normal                                         18.5-24.9  Overweight                                     25.0-29.9  OBESITY                     I                  30.0-34.9                             II                 35.0-39.9  EXTREME OBESITY             III                >40                            Patient's  BMI Body mass index is 27.47 kg/m .  http://hin.nhlbi.nih.gov/menuplanner/menu.cgi  Pre-visit planning  Immunizations - up to date  Colonoscopy - is up to date  Mammogram -   Asthma -   PHQ9 -    GRETA-7 -

## 2022-10-09 ENCOUNTER — HEALTH MAINTENANCE LETTER (OUTPATIENT)
Age: 77
End: 2022-10-09

## 2022-10-24 ENCOUNTER — OFFICE VISIT (OUTPATIENT)
Dept: FAMILY MEDICINE | Facility: CLINIC | Age: 77
End: 2022-10-24

## 2022-10-24 VITALS
OXYGEN SATURATION: 96 % | WEIGHT: 197.4 LBS | HEIGHT: 71 IN | SYSTOLIC BLOOD PRESSURE: 124 MMHG | BODY MASS INDEX: 27.64 KG/M2 | HEART RATE: 65 BPM | TEMPERATURE: 96.7 F | DIASTOLIC BLOOD PRESSURE: 78 MMHG

## 2022-10-24 DIAGNOSIS — I25.810 CORONARY ARTERY DISEASE INVOLVING AUTOLOGOUS ARTERY CORONARY BYPASS GRAFT WITHOUT ANGINA PECTORIS: ICD-10-CM

## 2022-10-24 DIAGNOSIS — I48.91 ATRIAL FIBRILLATION, UNSPECIFIED TYPE (H): ICD-10-CM

## 2022-10-24 DIAGNOSIS — Z12.5 SPECIAL SCREENING FOR MALIGNANT NEOPLASM OF PROSTATE: ICD-10-CM

## 2022-10-24 DIAGNOSIS — I10 BENIGN ESSENTIAL HYPERTENSION: ICD-10-CM

## 2022-10-24 DIAGNOSIS — E78.2 MIXED HYPERLIPIDEMIA: Primary | ICD-10-CM

## 2022-10-24 LAB
% GRANULOCYTES: 65.2 %
ALBUMIN SERPL-MCNC: 4.4 G/DL (ref 3.6–5.1)
ALBUMIN/GLOB SERPL: 1.4 {RATIO} (ref 1–2.5)
ALP SERPL-CCNC: 90 U/L (ref 33–130)
ALT 1742-6: 14 U/L (ref 0–32)
AST 1920-8: 18 U/L (ref 0–35)
BILIRUB SERPL-MCNC: 1 MG/DL (ref 0.2–1.2)
BUN SERPL-MCNC: 16 MG/DL (ref 7–25)
BUN/CREATININE RATIO: 12.8 (ref 6–22)
CALCIUM SERPL-MCNC: 9.6 MG/DL (ref 8.6–10.3)
CHLORIDE SERPLBLD-SCNC: 106.6 MMOL/L (ref 98–110)
CHOLEST SERPL-MCNC: 151 MG/DL (ref 0–199)
CHOLEST/HDLC SERPL: 3 {RATIO} (ref 0–5)
CO2 SERPL-SCNC: 29 MMOL/L (ref 20–32)
CREAT SERPL-MCNC: 1.25 MG/DL (ref 0.6–1.3)
GLOBULIN, CALCULATED - QUEST: 3.2 (ref 1.9–3.7)
GLUCOSE SERPL-MCNC: 94 MG/DL (ref 60–99)
HCT VFR BLD AUTO: 49.9 % (ref 40–53)
HDLC SERPL-MCNC: 52 MG/DL (ref 40–150)
HEMOGLOBIN: 16.4 G/DL (ref 13.3–17.7)
LDLC SERPL CALC-MCNC: 78 MG/DL (ref 0–130)
LYMPHOCYTES NFR BLD AUTO: 27.2 %
MCH RBC QN AUTO: 31.7 PG (ref 26–33)
MCHC RBC AUTO-ENTMCNC: 32.9 G/DL (ref 31–36)
MCV RBC AUTO: 96.6 FL (ref 78–100)
MONOCYTES NFR BLD AUTO: 7.6 %
PLATELET COUNT - QUEST: 260 10^9/L (ref 150–375)
POTASSIUM SERPL-SCNC: 5.33 MMOL/L (ref 3.5–5.3)
PROT SERPL-MCNC: 7.6 G/DL (ref 6.1–8.1)
RBC # BLD AUTO: 5.17 10*12/L (ref 4.4–5.9)
SODIUM SERPL-SCNC: 142.4 MMOL/L (ref 135–146)
TRIGL SERPL-MCNC: 105 MG/DL (ref 0–149)
WBC # BLD AUTO: 11.3 10*9/L (ref 4–11)

## 2022-10-24 PROCEDURE — 36415 COLL VENOUS BLD VENIPUNCTURE: CPT | Performed by: FAMILY MEDICINE

## 2022-10-24 PROCEDURE — 80061 LIPID PANEL: CPT | Performed by: FAMILY MEDICINE

## 2022-10-24 PROCEDURE — 80053 COMPREHEN METABOLIC PANEL: CPT | Performed by: FAMILY MEDICINE

## 2022-10-24 PROCEDURE — 99214 OFFICE O/P EST MOD 30 MIN: CPT | Performed by: FAMILY MEDICINE

## 2022-10-24 PROCEDURE — 85025 COMPLETE CBC W/AUTO DIFF WBC: CPT | Performed by: FAMILY MEDICINE

## 2022-10-24 RX ORDER — ATORVASTATIN CALCIUM 40 MG/1
40 TABLET, FILM COATED ORAL DAILY
Qty: 90 TABLET | Refills: 1 | Status: SHIPPED | OUTPATIENT
Start: 2022-10-24 | End: 2023-04-20

## 2022-10-24 RX ORDER — ATENOLOL 25 MG/1
25 TABLET ORAL DAILY
Qty: 90 TABLET | Refills: 1 | Status: SHIPPED | OUTPATIENT
Start: 2022-10-24 | End: 2023-04-20

## 2022-10-24 NOTE — NURSING NOTE
Chief Complaint   Patient presents with     Refill Request     Pt is here for a med refill.     Pre-visit Screening:  Immunizations:  up to date  Colonoscopy:  is due and to be scheduled by patient for later completion  Mammogram: NICK  Asthma Action Test/Plan:  NA  PHQ9:  NA  GAD7:  NA  Questioned patient about current smoking habits Pt. has never smoked.  Ok to leave detailed message on voice mail for today's visit only yes, phone # [phone]

## 2022-10-24 NOTE — PROGRESS NOTES
"  Assessment & Plan     Mixed hyperlipidemia  Control uncertain, continue current medications at current doses pending labs  - Lipid Panel (BFP)  - Comprehensive Metobolic Panel (BFP)  - VENOUS COLLECTION  - atorvastatin (LIPITOR) 40 MG tablet  Dispense: 90 tablet; Refill: 1    Benign essential hypertension  Well controlled, continue current medications at current doses   - Comprehensive Metobolic Panel (BFP)  - VENOUS COLLECTION  - atenolol (TENORMIN) 25 MG tablet  Dispense: 90 tablet; Refill: 1    Coronary artery disease involving autologous artery coronary bypass graft without angina pectoris  Well controlled, continue current medications at current doses   - atorvastatin (LIPITOR) 40 MG tablet  Dispense: 90 tablet; Refill: 1    Atrial fibrillation, unspecified type (H)  Well controlled, continue current medications at current doses   - HEMOGRAM PLATELET DIFF (BFP)  - VENOUS COLLECTION  - atenolol (TENORMIN) 25 MG tablet  Dispense: 90 tablet; Refill: 1  - rivaroxaban ANTICOAGULANT (XARELTO ANTICOAGULANT) 20 MG TABS tablet  Dispense: 14 tablet; Refill: 0    Special screening for malignant neoplasm of prostate        Review of the result(s) of each unique test - labs  Ordering of each unique test  Prescription drug management         BMI:   Estimated body mass index is 27.92 kg/m  as calculated from the following:    Height as of this encounter: 1.791 m (5' 10.5\").    Weight as of this encounter: 89.5 kg (197 lb 6.4 oz).       FUTURE APPOINTMENTS:       - Follow-up visit in 6 mo  Regular exercise    No follow-ups on file.    Jose David Griffin MD  [facility]    Subjective   Jam is a 77 year old, presenting for the following health issues:  Refill Request (Pt is here for a med refill.)      HPI     Hyperlipidemia Follow-Up      Are you regularly taking any medication or supplement to lower your cholesterol?   Yes- atorvastatin    Are you having muscle aches or other side effects that you " "think could be caused by your cholesterol lowering medication?  No    Hypertension Follow-up      Do you check your blood pressure regularly outside of the clinic? Yes     Are you following a low salt diet? No    Are your blood pressures ever more than 140 on the top number (systolic) OR more   than 90 on the bottom number (diastolic), for example 140/90? No    Atrial Fibrillation Follow-up      Symptoms: no recent chest pain, significant palpitations, dizziness/lightheadedness, dyspnea, or increased peripheral edema.    Stroke prevention: DOAC (Eliquis, Xarelto, Pradaxa)    Date 4/13/2021 10/5/2021 10/28/2021 4/14/2022 10/24/2022   Pulse 72 50 48 68 65     Current [id number] Score: 2 points, which represents a 2.2% annual risk of major embolic event, without anti-coagulation or an LAAO device.   Vascular Disease Follow-up      How often do you take nitroglycerin? Never    Do you take an aspirin every day? No      How many servings of fruits and vegetables do you eat daily?  2-3    On average, how many sweetened beverages do you drink each day (Examples: soda, juice, sweet tea, etc.  Do NOT count diet or artificially sweetened beverages)?   1    How many days per week do you exercise enough to make your heart beat faster? 5    How many minutes a day do you exercise enough to make your heart beat faster? 20 - 29    How many days per week do you miss taking your medication? 0        Review of Systems   Constitutional, HEENT, cardiovascular, pulmonary, gi and gu systems are negative, except as otherwise noted.      Objective    /78 (BP Location: Right arm, Patient Position: Sitting, Cuff Size: Adult Large)   Pulse 65   Temp (!) 96.7  F (35.9  C) (Temporal)   Ht 1.791 m (5' 10.5\")   Wt 89.5 kg (197 lb 6.4 oz)   SpO2 96%   BMI 27.92 kg/m    Body mass index is 27.92 kg/m .  Physical Exam   GENERAL: healthy, alert and no distress  EYES: Eyes grossly normal to inspection, PERRL and conjunctivae and sclerae " normal  HENT: ear canals and TM's normal, nose and mouth without ulcers or lesions  NECK: no adenopathy, no asymmetry, masses, or scars and thyroid normal to palpation  RESP: lungs clear to auscultation - no rales, rhonchi or wheezes  CV: regular rate and rhythm, normal S1 S2, no S3 or S4, no murmur, click or rub, no peripheral edema and peripheral pulses strong  ABDOMEN: soft, nontender, no hepatosplenomegaly, no masses and bowel sounds normal  MS: no gross musculoskeletal defects noted, no edema  PSYCH: mentation appears normal, affect normal/bright    Office Visit on 04/14/2022   Component Date Value Ref Range Status     Cholesterol 04/14/2022 124  0 - 199 mg/dL Final     Triglycerides 04/14/2022 92  0 - 149 mg/dL Final     HDL Cholesterol 04/14/2022 52  40 - 150 mg/dL Final     LDL Cholesterol Direct 04/14/2022 54  0 - 130 mg/dL Final     Cholesterol/HDL Ratio 04/14/2022 2  0 - 5 Final     Carbon Dioxide 04/14/2022 24.9  20 - 32 mmol/L Final     Creatinine 04/14/2022 1.34 (A)  0.60 - 1.30 mg/dL Final    ran twice      Glucose 04/14/2022 93  60 - 99 mg/dL Final     Sodium 04/14/2022 139.7  135 - 146 mmol/L Final     Potassium 04/14/2022 4.93  3.5 - 5.3 mmol/L Final     Chloride 04/14/2022 104.4  98 - 110 mmol/L Final     Protein Total 04/14/2022 7.8  6.1 - 8.1 g/dL Final     Albumin 04/14/2022 4.6  3.6 - 5.1 g/dL Final     Alkaline Phosphatase 04/14/2022 86  33 - 130 U/L Final     ALT 04/14/2022 12  0 - 32 U/L Final     AST 04/14/2022 13  0 - 35 U/L Final     Bilirubin Total 04/14/2022 0.8  0.2 - 1.2 mg/dL Final     Urea Nitrogen 04/14/2022 18  7 - 25 mg/dL Final     Calcium 04/14/2022 9.2  8.6 - 10.3 mg/dL Final     BUN/Creatinine Ratio 04/14/2022 13.3  6 - 22 Final     Globulin Calculated 04/14/2022 3.2  1.9 - 3.7 Final     A/G Ratio 04/14/2022 1.4  1 - 2.5 Final     GFR Estimate 04/14/2022 56 (A)  >60 ml/min/1.73m2 Final

## 2023-04-20 ENCOUNTER — OFFICE VISIT (OUTPATIENT)
Dept: FAMILY MEDICINE | Facility: CLINIC | Age: 78
End: 2023-04-20

## 2023-04-20 VITALS
TEMPERATURE: 98.5 F | HEART RATE: 65 BPM | DIASTOLIC BLOOD PRESSURE: 78 MMHG | HEIGHT: 71 IN | SYSTOLIC BLOOD PRESSURE: 122 MMHG | OXYGEN SATURATION: 98 % | WEIGHT: 201.2 LBS | BODY MASS INDEX: 28.17 KG/M2

## 2023-04-20 DIAGNOSIS — E78.2 MIXED HYPERLIPIDEMIA: Primary | ICD-10-CM

## 2023-04-20 DIAGNOSIS — I25.810 CORONARY ARTERY DISEASE INVOLVING AUTOLOGOUS ARTERY CORONARY BYPASS GRAFT WITHOUT ANGINA PECTORIS: ICD-10-CM

## 2023-04-20 DIAGNOSIS — I10 BENIGN ESSENTIAL HYPERTENSION: ICD-10-CM

## 2023-04-20 DIAGNOSIS — I48.91 ATRIAL FIBRILLATION, UNSPECIFIED TYPE (H): ICD-10-CM

## 2023-04-20 DIAGNOSIS — Z12.5 SPECIAL SCREENING FOR MALIGNANT NEOPLASM OF PROSTATE: ICD-10-CM

## 2023-04-20 LAB
ALBUMIN SERPL-MCNC: 4.4 G/DL (ref 3.6–5.1)
ALBUMIN/GLOB SERPL: 1.6 {RATIO} (ref 1–2.5)
ALP SERPL-CCNC: 79 U/L (ref 33–130)
ALT 1742-6: 8 U/L (ref 0–32)
AST 1920-8: 15 U/L (ref 0–35)
BILIRUB SERPL-MCNC: 0.5 MG/DL (ref 0.2–1.2)
BUN SERPL-MCNC: 14 MG/DL (ref 7–25)
BUN/CREATININE RATIO: 12.4 (ref 6–22)
CALCIUM SERPL-MCNC: 9.2 MG/DL (ref 8.6–10.3)
CHLORIDE SERPLBLD-SCNC: 107.1 MMOL/L (ref 98–110)
CHOLEST SERPL-MCNC: 156 MG/DL (ref 0–199)
CHOLEST/HDLC SERPL: 3 {RATIO} (ref 0–5)
CO2 SERPL-SCNC: 29.7 MMOL/L (ref 20–32)
CREAT SERPL-MCNC: 1.13 MG/DL (ref 0.6–1.3)
GLOBULIN, CALCULATED - QUEST: 2.8 (ref 1.9–3.7)
GLUCOSE SERPL-MCNC: 94 MG/DL (ref 60–99)
HDLC SERPL-MCNC: 47 MG/DL (ref 40–150)
LDLC SERPL CALC-MCNC: 91 MG/DL (ref 0–130)
POTASSIUM SERPL-SCNC: 4.88 MMOL/L (ref 3.5–5.3)
PROT SERPL-MCNC: 7.2 G/DL (ref 6.1–8.1)
SODIUM SERPL-SCNC: 142.7 MMOL/L (ref 135–146)
TRIGL SERPL-MCNC: 92 MG/DL (ref 0–149)

## 2023-04-20 PROCEDURE — 36415 COLL VENOUS BLD VENIPUNCTURE: CPT | Performed by: FAMILY MEDICINE

## 2023-04-20 PROCEDURE — 80061 LIPID PANEL: CPT | Performed by: FAMILY MEDICINE

## 2023-04-20 PROCEDURE — 80053 COMPREHEN METABOLIC PANEL: CPT | Performed by: FAMILY MEDICINE

## 2023-04-20 PROCEDURE — 99214 OFFICE O/P EST MOD 30 MIN: CPT | Performed by: FAMILY MEDICINE

## 2023-04-20 RX ORDER — ATORVASTATIN CALCIUM 40 MG/1
40 TABLET, FILM COATED ORAL DAILY
Qty: 90 TABLET | Refills: 1 | Status: SHIPPED | OUTPATIENT
Start: 2023-04-20

## 2023-04-20 RX ORDER — ATENOLOL 25 MG/1
25 TABLET ORAL DAILY
Qty: 90 TABLET | Refills: 1 | Status: SHIPPED | OUTPATIENT
Start: 2023-04-20

## 2023-04-20 NOTE — NURSING NOTE
Chief Complaint   Patient presents with     Recheck Medication     Fasting today, refill medications     Pre-visit Screening:  Immunizations:  not up to date - shingrix at pharmacy  Colonoscopy:  is up to date  Mammogram: NA  Asthma Action Test/Plan:  NA  PHQ9:  NA  GAD7:  NA  Questioned patient about current smoking habits Pt. has never smoked.  Ok to leave detailed message on voice mail for today's visit only Yes, phone # [phone]

## 2023-04-20 NOTE — PROGRESS NOTES
"  Assessment & Plan     Mixed hyperlipidemia  Control uncertain, continue current medications at current doses pending labs    Benign essential hypertension  Well controlled, continue current medications at current doses     Coronary artery disease involving autologous artery coronary bypass graft without angina pectoris  Well controlled, continue current medications at current doses     Atrial fibrillation, unspecified type (H)  Well controlled, continue current medications at current doses     Special screening for malignant neoplasm of prostate        Review of the result(s) of each unique test - labs  Ordering of each unique test  Prescription drug management         BMI:   Estimated body mass index is 28.46 kg/m  as calculated from the following:    Height as of this encounter: 1.791 m (5' 10.5\").    Weight as of this encounter: 91.3 kg (201 lb 3.2 oz).       FUTURE APPOINTMENTS:       - Follow-up visit in 6 mo  Regular exercise    No follow-ups on file.    Jose David Griffin MD  [facility]    Subjective   Jam is a 77 year old, presenting for the following health issues:  Recheck Medication (Fasting today, refill medications)         View : No data to display.              HPI     Hyperlipidemia Follow-Up      Are you regularly taking any medication or supplement to lower your cholesterol?   Yes- atorvastatin    Are you having muscle aches or other side effects that you think could be caused by your cholesterol lowering medication?  No    Hypertension Follow-up      Do you check your blood pressure regularly outside of the clinic? Yes     Are you following a low salt diet? No    Are your blood pressures ever more than 140 on the top number (systolic) OR more   than 90 on the bottom number (diastolic), for example 140/90? No    Atrial Fibrillation Follow-up      Symptoms: no recent chest pain, significant palpitations, dizziness/lightheadedness, dyspnea, or increased peripheral " "edema.    Stroke prevention: DOAC (Eliquis, Xarelto, Pradaxa)        10/5/2021     9:09 AM 10/28/2021     9:35 AM 4/14/2022    10:00 AM 10/24/2022    10:34 AM 4/20/2023     9:33 AM   Date   Pulse 50 48 68 65 65     Current [id number] Score: 3 points, which represents a 3.2% annual risk of major embolic event, without anti-coagulation or an LAAO device.     Vascular Disease Follow-up      How often do you take nitroglycerin? Never    Do you take an aspirin every day? No      How many servings of fruits and vegetables do you eat daily?  2-3    On average, how many sweetened beverages do you drink each day (Examples: soda, juice, sweet tea, etc.  Do NOT count diet or artificially sweetened beverages)?   1    How many days per week do you exercise enough to make your heart beat faster? 5    How many minutes a day do you exercise enough to make your heart beat faster? 20 - 29    How many days per week do you miss taking your medication? 0          Review of Systems   Constitutional, HEENT, cardiovascular, pulmonary, gi and gu systems are negative, except as otherwise noted.      Objective    /78 (BP Location: Left arm, Patient Position: Sitting, Cuff Size: Adult Large)   Pulse 65   Temp 98.5  F (36.9  C) (Temporal)   Ht 1.791 m (5' 10.5\")   Wt 91.3 kg (201 lb 3.2 oz)   SpO2 98%   BMI 28.46 kg/m    Body mass index is 28.46 kg/m .  Physical Exam   GENERAL: healthy, alert and no distress  EYES: Eyes grossly normal to inspection, PERRL and conjunctivae and sclerae normal  HENT: ear canals and TM's normal, nose and mouth without ulcers or lesions  NECK: no adenopathy, no asymmetry, masses, or scars and thyroid normal to palpation  RESP: lungs clear to auscultation - no rales, rhonchi or wheezes  CV: regular rate and rhythm, normal S1 S2, no S3 or S4, no murmur, click or rub, no peripheral edema and peripheral pulses strong  ABDOMEN: soft, nontender, no hepatosplenomegaly, no masses and bowel sounds normal  MS: " no gross musculoskeletal defects noted, no edema  PSYCH: mentation appears normal, affect normal/bright    Office Visit on 10/24/2022   Component Date Value Ref Range Status     Cholesterol 10/24/2022 151  0 - 199 mg/dL Final     Triglycerides 10/24/2022 105  0 - 149 mg/dL Final     HDL Cholesterol 10/24/2022 52  40 - 150 mg/dL Final     LDL Cholesterol Direct 10/24/2022 78  0 - 130 mg/dL Final     Cholesterol/HDL Ratio 10/24/2022 3  0 - 5 Final     Carbon Dioxide 10/24/2022 29.0  20 - 32 mmol/L Final     Creatinine 10/24/2022 1.25  0.60 - 1.30 mg/dL Final     Glucose 10/24/2022 94  60 - 99 mg/dL Final     Sodium 10/24/2022 142.4  135 - 146 mmol/L Final     Potassium 10/24/2022 5.33 (A)  3.5 - 5.3 mmol/L Final     Chloride 10/24/2022 106.6  98 - 110 mmol/L Final     Protein Total 10/24/2022 7.6  6.1 - 8.1 g/dL Final     Albumin 10/24/2022 4.4  3.6 - 5.1 g/dL Final     Alkaline Phosphatase 10/24/2022 90  33 - 130 U/L Final     ALT 10/24/2022 14  0 - 32 U/L Final     AST 10/24/2022 18  0 - 35 U/L Final     Bilirubin Total 10/24/2022 1.0  0.2 - 1.2 mg/dL Final     Urea Nitrogen 10/24/2022 16  7 - 25 mg/dL Final     Calcium 10/24/2022 9.6  8.6 - 10.3 mg/dL Final     BUN/Creatinine Ratio 10/24/2022 12.8  6 - 22 Final     Globulin Calculated 10/24/2022 3.2  1.9 - 3.7 Final     A/G Ratio 10/24/2022 1.4  1 - 2.5 Final     WBC 10/24/2022 11.3 (A)  4.0 - 11 10*9/L Final     RBC Count 10/24/2022 5.17  4.4 - 5.9 10*12/L Final     Hemoglobin 10/24/2022 16.4  13.3 - 17.7 g/dL Final     Hematocrit 10/24/2022 49.9  40.0 - 53.0 % Final     MCV 10/24/2022 96.6  78 - 100 fL Final     MCH 10/24/2022 31.7  26 - 33 pg Final     MCHC 10/24/2022 32.9  31 - 36 g/dL Final     Platelet Count 10/24/2022 260  150 - 375 10^9/L Final     % Granulocytes 10/24/2022 65.2  % Final     % Lymphocytes 10/24/2022 27.2  % Final     % Monocytes 10/24/2022 7.6  % Final

## 2023-06-10 ENCOUNTER — HEALTH MAINTENANCE LETTER (OUTPATIENT)
Age: 78
End: 2023-06-10

## 2023-08-01 ENCOUNTER — OFFICE VISIT (OUTPATIENT)
Dept: FAMILY MEDICINE | Facility: CLINIC | Age: 78
End: 2023-08-01

## 2023-08-01 VITALS
WEIGHT: 189 LBS | TEMPERATURE: 97.5 F | RESPIRATION RATE: 20 BRPM | DIASTOLIC BLOOD PRESSURE: 76 MMHG | BODY MASS INDEX: 26.46 KG/M2 | HEART RATE: 80 BPM | SYSTOLIC BLOOD PRESSURE: 124 MMHG | HEIGHT: 71 IN

## 2023-08-01 DIAGNOSIS — I25.810 CORONARY ARTERY DISEASE INVOLVING AUTOLOGOUS ARTERY CORONARY BYPASS GRAFT WITHOUT ANGINA PECTORIS: ICD-10-CM

## 2023-08-01 DIAGNOSIS — I10 BENIGN ESSENTIAL HYPERTENSION: ICD-10-CM

## 2023-08-01 DIAGNOSIS — Z00.00 ENCOUNTER FOR MEDICARE ANNUAL WELLNESS EXAM: ICD-10-CM

## 2023-08-01 DIAGNOSIS — E78.2 MIXED HYPERLIPIDEMIA: Primary | ICD-10-CM

## 2023-08-01 DIAGNOSIS — Z12.5 SPECIAL SCREENING FOR MALIGNANT NEOPLASM OF PROSTATE: ICD-10-CM

## 2023-08-01 DIAGNOSIS — I48.91 ATRIAL FIBRILLATION, UNSPECIFIED TYPE (H): ICD-10-CM

## 2023-08-01 LAB
ALBUMIN SERPL-MCNC: 4.6 G/DL (ref 3.6–5.1)
ALBUMIN/GLOB SERPL: 1.6 {RATIO} (ref 1–2.5)
ALP SERPL-CCNC: 82 U/L (ref 33–130)
ALT 1742-6: 13 U/L (ref 0–32)
AST 1920-8: 20 U/L (ref 0–35)
BILIRUB SERPL-MCNC: 1 MG/DL (ref 0.2–1.2)
BUN SERPL-MCNC: 14 MG/DL (ref 7–25)
BUN/CREATININE RATIO: 11.3 (ref 6–32)
CALCIUM SERPL-MCNC: 9.6 MG/DL (ref 8.6–10.3)
CHLORIDE SERPLBLD-SCNC: 106.2 MMOL/L (ref 98–110)
CO2 SERPL-SCNC: 24.9 MMOL/L (ref 20–32)
CREAT SERPL-MCNC: 1.24 MG/DL (ref 0.6–1.3)
GLOBULIN, CALCULATED - QUEST: 2.9 (ref 1.9–3.7)
GLUCOSE SERPL-MCNC: 97 MG/DL (ref 60–99)
POTASSIUM SERPL-SCNC: 4.79 MMOL/L (ref 3.5–5.3)
PROT SERPL-MCNC: 7.5 G/DL (ref 6.1–8.1)
SODIUM SERPL-SCNC: 141.2 MMOL/L (ref 135–146)

## 2023-08-01 PROCEDURE — 99214 OFFICE O/P EST MOD 30 MIN: CPT | Mod: 25 | Performed by: FAMILY MEDICINE

## 2023-08-01 PROCEDURE — 80053 COMPREHEN METABOLIC PANEL: CPT | Performed by: FAMILY MEDICINE

## 2023-08-01 PROCEDURE — 36415 COLL VENOUS BLD VENIPUNCTURE: CPT | Performed by: FAMILY MEDICINE

## 2023-08-01 PROCEDURE — G0439 PPPS, SUBSEQ VISIT: HCPCS | Performed by: FAMILY MEDICINE

## 2023-08-01 NOTE — PATIENT INSTRUCTIONS
Patient Education   Personalized Prevention Plan  You are due for the preventive services outlined below.  Your care team is available to assist you in scheduling these services.  If you have already completed any of these items, please share that information with your care team to update in your medical record.  Health Maintenance Due   Topic Date Due     Annual Wellness Visit  Never done     COVID-19 Vaccine (5 - Moderna series) 02/14/2023

## 2023-08-01 NOTE — PROGRESS NOTES
Jose David Zhang is a 78 year old male who presents for Medicare Annual Wellness Visit.    Current providers caring for this patient include:  Patient Care Team:  Jose David Griffin MD as PCP - General (Family Practice)  Jose David Griffin MD as Assigned PCP    Complete Medical and Social history reviewed with patient, outlined below.    Patient Active Problem List   Diagnosis    BENIGN CORDELIA CEREBR MENINGES/meningioma    Health Care Home    Acromioclavicular joint arthritis    ACP (advance care planning)    Coronary artery disease involving autologous artery coronary bypass graft without angina pectoris    Nonintractable epilepsy without status epilepticus, unspecified epilepsy type (H)    Traumatic brain injury with loss of consciousness (H)    Atrial fibrillation (H)    Chronic anticoagulation       Past Medical History:   Diagnosis Date    Benign neoplasm of cerebral meninges (H)     left sphenoid wing, dr kelly /meningioma    Chronic ischemic heart disease, unspecified     EPILEPSY NOS W/O MENTN INTRACTABLE/due to tumor 2006    Fam hx-cardiovas dis NEC     Mixed hyperlipidemia     Postsurgical 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/01       Past Surgical History:   Procedure Laterality Date    EYE EXAM ESTABLISHED PT      HCL PSA, DIAGNOSTIC (TUMOR MARKER)      MOHS MICROGRAPHIC PROCEDURE  2018    DR Wiggins/ nose    SIGMOIDOSCOPY,DIAGNOSTIC      [facility]    Z CABG, ARTERIAL, FOUR+  10/9/01    [facility]    Patient Reported       Family History   Problem Relation Age of Onset    Heart Disease Father          at 59    Cancer No family hx of     Diabetes No family hx of        Social History     Tobacco Use    Smoking status: Former     Packs/day: 0.25     Years: 20.00     Pack years: 5.00     Types: Cigarettes     Quit date:      Years since quittin.6     Passive exposure: Never    Smokeless tobacco: Never    Tobacco comments:  "    occasional cigar   Substance Use Topics    Alcohol use: Yes     Alcohol/week: 1.0 standard drink of alcohol     Types: 1 Standard drinks or equivalent per week     Comment: rare       Diet: regular, low salt/low fat  Physical Activity: active without specific exercise program  Depression Screen:    Over the past 2 weeks, patient has felt down, depressed, or hopeless:  No    Over the past 2 weeks, patient has felt little interest or pleasure in doing things: No    Functional ability/Safety screen:  Up and go test (able to get up and walk longer than 30 seconds): Passed  Patient needs assistance with: nothing  Patient's home has the following possible safety concerns: none identified  Patient has concerns about his hearing:  No  Cognitive Screen  Patient repeats three objects (ball, flag, tree)      Clock drawing test:   NORMAL  Recalls three objects after 3 minutes (ball,flag,tree):                                                                                               recalls 2 objects (2 points)    Physical Exam:  /76 (BP Location: Left arm, Patient Position: Chair, Cuff Size: Adult Regular)   Pulse 80   Temp 97.5  F (36.4  C) (Temporal)   Resp 20   Ht 1.791 m (5' 10.5\")   Wt 85.7 kg (189 lb)   BMI 26.74 kg/m     Body mass index is 26.74 kg/m .              End of Life Planning:   Patient currently has an advanced directive: Yes.  Practitioner is supportive of decision.    Education/Counseling:   Based on review of the above information, the following items were addressed:      Elevated blood pressure - follow-up plans made    Appropriate preventive services were discussed with this patient, including applicable screening as appropriate for cardiovascular disease, diabetes, osteopenia/osteoporosis, and glaucoma.  As appropriate for age/gender, discussed screening for colorectal cancer, prostate cancer, breast cancer, and cervical cancer.   Checklist reviewing preventive services available has " been given to the patient.              Assessment & Plan     Mixed hyperlipidemia  Controlled, continue current medications at current doses     Benign essential hypertension  Well controlled, continue current medications at current doses     Coronary artery disease involving autologous artery coronary bypass graft without angina pectoris  stable symptomatically continue current medications at current doses     Atrial fibrillation, unspecified type (H)  Well controlled, continue current medications at current doses     Encounter for Medicare annual wellness exam  [facility]     Special screening for malignant neoplasm of prostate        Review of the result(s) of each unique test - labs  Ordering of each unique test  Prescription drug management         FUTURE APPOINTMENTS:       - Follow-up visit in 6 mo  Regular exercise    Return in about 53 weeks (around 8/6/2024) for Annual Wellness Visit.    Jose David Griffin MD  [facility]    Wallace Polk is a 78 year old, presenting for the following health issues:  Recheck Medication and Wellness Visit    HPI     Hyperlipidemia Follow-Up    Are you regularly taking any medication or supplement to lower your cholesterol?   Yes- atorvastatin  Are you having muscle aches or other side effects that you think could be caused by your cholesterol lowering medication?  No    Hypertension Follow-up    Do you check your blood pressure regularly outside of the clinic? Yes   Are you following a low salt diet? No  Are your blood pressures ever more than 140 on the top number (systolic) OR more   than 90 on the bottom number (diastolic), for example 140/90? No    Atrial Fibrillation Follow-up    Symptoms: no recent chest pain, significant palpitations, dizziness/lightheadedness, dyspnea, or increased peripheral edema.  Stroke prevention: DOAC (Eliquis, Xarelto, Pradaxa)        10/28/2021     9:35 AM 4/14/2022    10:00 AM 10/24/2022     "10:34 AM 4/20/2023     9:33 AM 8/1/2023     9:30 AM   Date   Pulse 48 68 65 65 80     Current [id number] Score: 4 points, which represents a 4.8% annual risk of major embolic event, without anti-coagulation or an LAAO device.       Vascular Disease Follow-up    How often do you take nitroglycerin? Never  Do you take an aspirin every day? No  How many servings of fruits and vegetables do you eat daily?  2-3  On average, how many sweetened beverages do you drink each day (Examples: soda, juice, sweet tea, etc.  Do NOT count diet or artificially sweetened beverages)?   1  How many days per week do you exercise enough to make your heart beat faster? 7  How many minutes a day do you exercise enough to make your heart beat faster? 20 - 29  How many days per week do you miss taking your medication? 0        Review of Systems   Constitutional, HEENT, cardiovascular, pulmonary, gi and gu systems are negative, except as otherwise noted.      Objective    /76 (BP Location: Left arm, Patient Position: Chair, Cuff Size: Adult Regular)   Pulse 80   Temp 97.5  F (36.4  C) (Temporal)   Resp 20   Ht 1.791 m (5' 10.5\")   Wt 85.7 kg (189 lb)   BMI 26.74 kg/m    Body mass index is 26.74 kg/m .  Physical Exam   GENERAL: healthy, alert and no distress  EYES: Eyes grossly normal to inspection, PERRL and conjunctivae and sclerae normal  HENT: ear canals and TM's normal, nose and mouth without ulcers or lesions  NECK: no adenopathy, no asymmetry, masses, or scars and thyroid normal to palpation  RESP: lungs clear to auscultation - no rales, rhonchi or wheezes  CV: regular rate and rhythm, normal S1 S2, no S3 or S4, no murmur, click or rub, no peripheral edema and peripheral pulses strong  ABDOMEN: soft, nontender, no hepatosplenomegaly, no masses and bowel sounds normal  MS: no gross musculoskeletal defects noted, no edema  PSYCH: mentation appears normal, affect normal/bright    Office Visit on 04/20/2023   Component Date " Value Ref Range Status    Cholesterol 04/20/2023 156  0 - 199 mg/dL Final    Triglycerides 04/20/2023 92  0 - 149 mg/dL Final    HDL Cholesterol 04/20/2023 47  40 - 150 mg/dL Final    LDL Cholesterol Direct 04/20/2023 91  0 - 130 mg/dL Final    Cholesterol/HDL Ratio 04/20/2023 3  0 - 5 Final    Carbon Dioxide 04/20/2023 29.7  20 - 32 mmol/L Final    Creatinine 04/20/2023 1.13  0.60 - 1.30 mg/dL Final    Glucose 04/20/2023 94  60 - 99 mg/dL Final    Sodium 04/20/2023 142.7  135 - 146 mmol/L Final    Potassium 04/20/2023 4.88  3.5 - 5.3 mmol/L Final    Chloride 04/20/2023 107.1  98 - 110 mmol/L Final    Protein Total 04/20/2023 7.2  6.1 - 8.1 g/dL Final    Albumin 04/20/2023 4.4  3.6 - 5.1 g/dL Final    Alkaline Phosphatase 04/20/2023 79  33 - 130 U/L Final    ALT 04/20/2023 8  0 - 32 U/L Final    AST 04/20/2023 15  0 - 35 U/L Final    Bilirubin Total 04/20/2023 0.5  0.2 - 1.2 mg/dL Final    Urea Nitrogen 04/20/2023 14  7 - 25 mg/dL Final    Calcium 04/20/2023 9.2  8.6 - 10.3 mg/dL Final    BUN/Creatinine Ratio 04/20/2023 12.4  6 - 22 Final    Globulin Calculated 04/20/2023 2.8  1.9 - 3.7 Final    A/G Ratio 04/20/2023 1.6  1 - 2.5 Final

## 2023-08-01 NOTE — NURSING NOTE
Jose David Zhang is here for fasting blood work and medication refill. Also Wellness visit  Questioned patient about current smoking habits.  Pt. quit smoking some time ago.  Body mass index is 33.67 kg/(m^2).  PULSE regular  My Chart: active    Pre-visit planning  Immunizations - up to date  [facility]  Mammogram -   Asthma -   PHQ9  GRETA-7    The patient has verbalized that it is ok to leave a detailed voice message on the patient's cell phone with results/recommendations from this visit.

## 2023-08-03 LAB — ABBOTT PSA - QUEST: 2.12 NG/ML

## 2023-09-28 ENCOUNTER — ALLIED HEALTH/NURSE VISIT (OUTPATIENT)
Dept: FAMILY MEDICINE | Facility: CLINIC | Age: 78
End: 2023-09-28

## 2023-09-28 DIAGNOSIS — Z23 NEED FOR VACCINATION: Primary | ICD-10-CM

## 2023-09-28 PROCEDURE — G0008 ADMIN INFLUENZA VIRUS VAC: HCPCS | Performed by: FAMILY MEDICINE

## 2023-09-28 PROCEDURE — 90662 IIV NO PRSV INCREASED AG IM: CPT | Performed by: FAMILY MEDICINE

## 2023-10-20 NOTE — NURSING NOTE
Chief Complaint   Patient presents with     Consult     sinus issues, been going on for a month     Pre-visit Screening:  Immunizations:  up to date  Colonoscopy:  is up to date  Mammogram:NA  Asthma Action Test/Plan:  NICK  PHQ9:  NA  GAD7:  NA  Questioned patient about current smoking habits Pt. has never smoked.  Ok to leave detailed message on voice mail for today's visit only no, phone # MyChart is best       134

## 2024-03-12 ENCOUNTER — OFFICE VISIT (OUTPATIENT)
Dept: FAMILY MEDICINE | Facility: CLINIC | Age: 79
End: 2024-03-12

## 2024-03-12 VITALS
WEIGHT: 195 LBS | TEMPERATURE: 97 F | DIASTOLIC BLOOD PRESSURE: 72 MMHG | SYSTOLIC BLOOD PRESSURE: 132 MMHG | HEIGHT: 71 IN | RESPIRATION RATE: 20 BRPM | BODY MASS INDEX: 27.3 KG/M2 | HEART RATE: 68 BPM

## 2024-03-12 DIAGNOSIS — I10 BENIGN ESSENTIAL HYPERTENSION: ICD-10-CM

## 2024-03-12 DIAGNOSIS — I25.810 CORONARY ARTERY DISEASE INVOLVING AUTOLOGOUS ARTERY CORONARY BYPASS GRAFT WITHOUT ANGINA PECTORIS: ICD-10-CM

## 2024-03-12 DIAGNOSIS — I48.91 ATRIAL FIBRILLATION, UNSPECIFIED TYPE (H): ICD-10-CM

## 2024-03-12 DIAGNOSIS — E78.2 MIXED HYPERLIPIDEMIA: ICD-10-CM

## 2024-03-12 LAB
ALBUMIN SERPL-MCNC: 4.1 G/DL (ref 3.6–5.1)
ALBUMIN/GLOB SERPL: 1.4 {RATIO} (ref 1–2.5)
ALP SERPL-CCNC: 63 U/L (ref 33–130)
ALT 1742-6: 11 U/L (ref 0–32)
AST 1920-8: 14 U/L (ref 0–35)
BILIRUB SERPL-MCNC: 0.8 MG/DL (ref 0.2–1.2)
BUN SERPL-MCNC: 15 MG/DL (ref 7–25)
BUN/CREATININE RATIO: 12.4 (ref 6–32)
CALCIUM SERPL-MCNC: 9.1 MG/DL (ref 8.6–10.3)
CHLORIDE SERPLBLD-SCNC: 106.7 MMOL/L (ref 98–110)
CHOLEST SERPL-MCNC: 190 MG/DL (ref 0–199)
CHOLEST/HDLC SERPL: 4 {RATIO} (ref 0–5)
CO2 SERPL-SCNC: 29.9 MMOL/L (ref 20–32)
CREAT SERPL-MCNC: 1.21 MG/DL (ref 0.6–1.3)
ERYTHROCYTE [DISTWIDTH] IN BLOOD BY AUTOMATED COUNT: 12.3 %
GLOBULIN, CALCULATED - QUEST: 2.9 (ref 1.9–3.7)
GLUCOSE SERPL-MCNC: 92 MG/DL (ref 60–99)
HCT VFR BLD AUTO: 47 % (ref 40–53)
HDLC SERPL-MCNC: 53 MG/DL (ref 40–150)
HEMOGLOBIN: 14.9 G/DL (ref 13.3–17.7)
LDLC SERPL CALC-MCNC: 115 MG/DL (ref 0–130)
MCH RBC QN AUTO: 31.4 PG (ref 26–33)
MCHC RBC AUTO-ENTMCNC: 31.7 G/DL (ref 31–36)
MCV RBC AUTO: 98.9 FL (ref 78–100)
PLATELET COUNT - QUEST: 276 10^9/L (ref 150–375)
POTASSIUM SERPL-SCNC: 4.84 MMOL/L (ref 3.5–5.3)
PROT SERPL-MCNC: 7 G/DL (ref 6.1–8.1)
RBC # BLD AUTO: 4.75 10*12/L (ref 4.4–5.9)
SODIUM SERPL-SCNC: 143.3 MMOL/L (ref 135–146)
TRIGL SERPL-MCNC: 110 MG/DL (ref 0–149)
WBC # BLD AUTO: 8.1 10*9/L (ref 4–11)

## 2024-03-12 PROCEDURE — 80053 COMPREHEN METABOLIC PANEL: CPT | Performed by: FAMILY MEDICINE

## 2024-03-12 PROCEDURE — 80061 LIPID PANEL: CPT | Performed by: FAMILY MEDICINE

## 2024-03-12 PROCEDURE — 36415 COLL VENOUS BLD VENIPUNCTURE: CPT | Performed by: FAMILY MEDICINE

## 2024-03-12 PROCEDURE — 99214 OFFICE O/P EST MOD 30 MIN: CPT | Performed by: FAMILY MEDICINE

## 2024-03-12 PROCEDURE — 85027 COMPLETE CBC AUTOMATED: CPT | Performed by: FAMILY MEDICINE

## 2024-03-12 RX ORDER — ATORVASTATIN CALCIUM 40 MG/1
40 TABLET, FILM COATED ORAL DAILY
Qty: 90 TABLET | Refills: 1 | Status: CANCELLED | OUTPATIENT
Start: 2024-03-12

## 2024-03-12 RX ORDER — ATENOLOL 25 MG/1
25 TABLET ORAL DAILY
Qty: 90 TABLET | Refills: 1 | Status: CANCELLED | OUTPATIENT
Start: 2024-03-12

## 2024-03-12 NOTE — PROGRESS NOTES
"  Assessment & Plan     Mixed hyperlipidemia  Control uncertain, continue current medications at current doses pending labs  - Comprehensive Metobolic Panel ([facility])  - VENOUS COLLECTION  - Lipid Panel ([facility])    Benign essential hypertension  Well controlled, continue current medications at current doses   - Comprehensive Metobolic Panel ([facility])  - VENOUS COLLECTION    Coronary artery disease involving autologous artery coronary bypass graft without angina pectoris  Well controlled, continue current medications at current doses   - VENOUS COLLECTION  - Hemogram Platelet ([facility])    Atrial fibrillation, unspecified type (H)  Well controlled, continue current medications at current doses   - VENOUS COLLECTION  - Hemogram Platelet ([facility])      Review of the result(s) of each unique test - labs  Ordering of each unique test  Prescription drug management        BMI  Estimated body mass index is 27.58 kg/m  as calculated from the following:    Height as of this encounter: 1.791 m (5' 10.5\").    Weight as of this encounter: 88.5 kg (195 lb).         FUTURE APPOINTMENTS:       - Follow-up visit in 6 mo  Regular exercise    No follow-ups on file.    Wallace Polk is a 78 year old, presenting for the following health issues:  Recheck Medication    HPI       Hyperlipidemia Follow-Up    Are you regularly taking any medication or supplement to lower your cholesterol?   Yes- atorvastatin  Are you having muscle aches or other side effects that you think could be caused by your cholesterol lowering medication?  No    Hypertension Follow-up    Do you check your blood pressure regularly outside of the clinic? Yes   Are you following a low salt diet? No  Are your blood pressures ever more than 140 on the top number (systolic) OR more   than 90 on the bottom number (diastolic), for example 140/90? No    Atrial Fibrillation Follow-up    Symptoms: no recent chest pain, significant palpitations, dizziness/lightheadedness, dyspnea, or increased " "peripheral edema.  Stroke prevention: DOAC (Eliquis, Xarelto, Pradaxa)        4/14/2022    10:00 AM 10/24/2022    10:34 AM 4/20/2023     9:33 AM 8/1/2023     9:30 AM 3/12/2024     9:12 AM   Date   Pulse 68 65 65 80 68     Current [id number] Score: 4 points, which represents a 4.8% annual risk of major embolic event, without anti-coagulation or an LAAO device.       Vascular Disease Follow-up    How often do you take nitroglycerin? Never  Do you take an aspirin every day? No  How many servings of fruits and vegetables do you eat daily?  2-3  On average, how many sweetened beverages do you drink each day (Examples: soda, juice, sweet tea, etc.  Do NOT count diet or artificially sweetened beverages)?   1  How many days per week do you exercise enough to make your heart beat faster? 4  How many minutes a day do you exercise enough to make your heart beat faster? varies  How many days per week do you miss taking your medication? 0        Review of Systems  Constitutional, HEENT, cardiovascular, pulmonary, gi and gu systems are negative, except as otherwise noted.      Objective    /72 (BP Location: Left arm, Patient Position: Chair, Cuff Size: Adult Regular)   Pulse 68   Temp 97  F (36.1  C) (Temporal)   Resp 20   Ht 1.791 m (5' 10.5\")   Wt 88.5 kg (195 lb)   BMI 27.58 kg/m    Body mass index is 27.58 kg/m .  Physical Exam   GENERAL: alert and no distress  EYES: Eyes grossly normal to inspection, PERRL and conjunctivae and sclerae normal  HENT: ear canals and TM's normal, nose and mouth without ulcers or lesions  NECK: no adenopathy, no asymmetry, masses, or scars  RESP: lungs clear to auscultation - no rales, rhonchi or wheezes  CV: regular rate and rhythm, normal S1 S2, no S3 or S4, no murmur, click or rub, no peripheral edema  ABDOMEN: soft, nontender, no hepatosplenomegaly, no masses and bowel sounds normal  MS: no gross musculoskeletal defects noted, no edema  PSYCH: mentation appears normal, affect " normal/bright    Office Visit on 08/01/2023   Component Date Value Ref Range Status    Carbon Dioxide 08/01/2023 24.9  20 - 32 mmol/L Final    Creatinine 08/01/2023 1.24  0.60 - 1.30 mg/dL Final    Glucose 08/01/2023 97  60 - 99 mg/dL Final    Sodium 08/01/2023 141.2  135 - 146 mmol/L Final    Potassium 08/01/2023 4.79  3.5 - 5.3 mmol/L Final    Chloride 08/01/2023 106.2  98 - 110 mmol/L Final    Protein Total 08/01/2023 7.5  6.1 - 8.1 g/dL Final    Albumin 08/01/2023 4.6  3.6 - 5.1 g/dL Final    Alkaline Phosphatase 08/01/2023 82  33 - 130 U/L Final    ALT 08/01/2023 13  0 - 32 U/L Final    AST 08/01/2023 20  0 - 35 U/L Final    Bilirubin Total 08/01/2023 1.0  0.2 - 1.2 mg/dL Final    Urea Nitrogen 08/01/2023 14  7 - 25 mg/dL Final    Calcium 08/01/2023 9.6  8.6 - 10.3 mg/dL Final    BUN/Creatinine Ratio 08/01/2023 11.3  6 - 32 Final    Globulin Calculated 08/01/2023 2.9  1.9 - 3.7 Final    A/G Ratio 08/01/2023 1.6  1 - 2.5 Final    Abbott PSA 08/02/2023 2.12  < OR = 4.00 ng/mL Final    Comment: The total PSA value from this assay system is   standardized against the WHO standard. The test   result will be approximately 20% lower when compared   to the equimolar-standardized total PSA ([place]). Comparison of serial PSA results should be   interpreted with this fact in mind.     This test was performed using the Siemens   chemiluminescent method. Values obtained from   different assay methods cannot be used  interchangeably. PSA levels, regardless of  value, should not be interpreted as absolute  evidence of the presence or absence of disease.             Signed Electronically by: Jose David Griffin MD

## 2024-03-12 NOTE — NURSING NOTE
Jose David Zhang is here for fasting blood work and medication refill.  Questioned patient about current smoking habits.  Pt. quit smoking some time ago.  Body mass index is 33.67 kg/(m^2).  PULSE regular  My Chart: active    Pre-visit planning  Immunizations - up to date  Colonoscopy -   Mammogram -   Asthma -   PHQ9  GRETA-7    The patient has verbalized that it is ok to leave a detailed voice message on the patient's cell phone with results/recommendations from this visit.

## 2024-10-12 ENCOUNTER — HEALTH MAINTENANCE LETTER (OUTPATIENT)
Age: 79
End: 2024-10-12

## 2024-10-22 ENCOUNTER — OFFICE VISIT (OUTPATIENT)
Dept: FAMILY MEDICINE | Facility: CLINIC | Age: 79
End: 2024-10-22

## 2024-10-22 VITALS
RESPIRATION RATE: 20 BRPM | HEART RATE: 68 BPM | DIASTOLIC BLOOD PRESSURE: 82 MMHG | BODY MASS INDEX: 25.34 KG/M2 | WEIGHT: 181 LBS | TEMPERATURE: 97.7 F | HEIGHT: 71 IN | SYSTOLIC BLOOD PRESSURE: 116 MMHG

## 2024-10-22 DIAGNOSIS — I25.810 CORONARY ARTERY DISEASE INVOLVING AUTOLOGOUS ARTERY CORONARY BYPASS GRAFT WITHOUT ANGINA PECTORIS: ICD-10-CM

## 2024-10-22 DIAGNOSIS — E78.2 MIXED HYPERLIPIDEMIA: Primary | ICD-10-CM

## 2024-10-22 DIAGNOSIS — Z12.5 SPECIAL SCREENING FOR MALIGNANT NEOPLASM OF PROSTATE: ICD-10-CM

## 2024-10-22 DIAGNOSIS — H61.23 BILATERAL IMPACTED CERUMEN: ICD-10-CM

## 2024-10-22 DIAGNOSIS — I48.91 ATRIAL FIBRILLATION, UNSPECIFIED TYPE (H): ICD-10-CM

## 2024-10-22 DIAGNOSIS — I10 BENIGN ESSENTIAL HYPERTENSION: ICD-10-CM

## 2024-10-22 DIAGNOSIS — Z00.00 ENCOUNTER FOR MEDICARE ANNUAL WELLNESS EXAM: ICD-10-CM

## 2024-10-22 PROCEDURE — 36415 COLL VENOUS BLD VENIPUNCTURE: CPT | Performed by: FAMILY MEDICINE

## 2024-10-22 PROCEDURE — 69209 REMOVE IMPACTED EAR WAX UNI: CPT | Mod: 50 | Performed by: FAMILY MEDICINE

## 2024-10-22 PROCEDURE — 99214 OFFICE O/P EST MOD 30 MIN: CPT | Mod: 25 | Performed by: FAMILY MEDICINE

## 2024-10-22 PROCEDURE — G0439 PPPS, SUBSEQ VISIT: HCPCS | Performed by: FAMILY MEDICINE

## 2024-10-22 RX ORDER — ATORVASTATIN CALCIUM 40 MG/1
40 TABLET, FILM COATED ORAL DAILY
Qty: 90 TABLET | Refills: 1 | Status: SHIPPED | OUTPATIENT
Start: 2024-10-22

## 2024-10-22 RX ORDER — ATENOLOL 25 MG/1
25 TABLET ORAL DAILY
Qty: 90 TABLET | Refills: 1 | Status: SHIPPED | OUTPATIENT
Start: 2024-10-22

## 2024-10-22 NOTE — PROGRESS NOTES
Jose David Zhang is a 79 year old male who presents for Medicare Annual Wellness Visit.    Current providers caring for this patient include:  Patient Care Team:  Jose David Griffin MD as PCP - General (Family Practice)  Jose David Griffin MD as Assigned PCP    Complete Medical and Social history reviewed with patient, outlined below.    Patient Active Problem List   Diagnosis    BENIGN CORDELIA CEREBR MENINGES/meningioma    Acromioclavicular joint arthritis    ACP (advance care planning)    Coronary artery disease involving autologous artery coronary bypass graft without angina pectoris    Nonintractable epilepsy without status epilepticus, unspecified epilepsy type (H)    Traumatic brain injury with loss of consciousness (H)    Atrial fibrillation (H)    Chronic anticoagulation       Past Medical History:   Diagnosis Date    Benign neoplasm of cerebral meninges (H)     left sphenoid wing, dr kelly /meningioma    Chronic ischemic heart disease, unspecified     EPILEPSY NOS W/O MENTN INTRACTABLE/due to tumor 2006    Fam hx-cardiovas dis NEC     Mixed hyperlipidemia     Postsurgical 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/01       Past Surgical History:   Procedure Laterality Date    EYE EXAM ESTABLISHED PT      HCL PSA, DIAGNOSTIC (TUMOR MARKER)      MOHS MICROGRAPHIC PROCEDURE  2018    DR Wiggins/ nose    SIGMOIDOSCOPY,DIAGNOSTIC      [facility]    Z CABG, ARTERIAL, FOUR+  10/9/01    [facility]  [facility]    Patient Reported       Family History   Problem Relation Age of Onset    Heart Disease Father          at 59    Cancer No family hx of     Diabetes No family hx of        Social History     Tobacco Use    Smoking status: Former     Current packs/day: 0.00     Average packs/day: 0.3 packs/day for 20.0 years (5.0 ttl pk-yrs)     Types: Cigarettes     Start date:      Quit date: 1990     Years since quittin.8     Passive exposure: Never     "Smokeless tobacco: Never    Tobacco comments:     occasional cigar   Substance Use Topics    Alcohol use: Yes     Alcohol/week: 1.0 standard drink of alcohol     Types: 1 Standard drinks or equivalent per week     Comment: rare       Diet: regular, low salt/low fat  Physical Activity: active without specific exercise program  Depression Screen:    Over the past 2 weeks, patient has felt down, depressed, or hopeless:  No    Over the past 2 weeks, patient has felt little interest or pleasure in doing things: No    Functional ability/Safety screen:  Up and go test (able to get up and walk longer than 30 seconds): Passed  Patient needs assistance with: nothing  Patient's home has the following possible safety concerns: none identified  Patient has concerns about his hearing:  Yes  Cognitive Screen  Patient repeats three objects (ball, flag, tree)      Clock drawing test:   NORMAL  Recalls three objects after 3 minutes (ball,flag,tree):                                                                                               recalls NO objects (no points)    Physical Exam:  /82 (BP Location: Left arm, Patient Position: Chair, Cuff Size: Adult Regular)   Pulse 68   Temp 97.7  F (36.5  C) (Temporal)   Resp 20   Ht 1.791 m (5' 10.5\")   Wt 82.1 kg (181 lb)   BMI 25.60 kg/m     Body mass index is 25.6 kg/m .               End of Life Planning:   Patient currently has an advanced directive: Yes.  Practitioner is supportive of decision.    Education/Counseling:   Based on review of the above information, the following items were addressed:      Elevated blood pressure - follow-up plans made    Appropriate preventive services were discussed with this patient, including applicable screening as appropriate for cardiovascular disease, diabetes, osteopenia/osteoporosis, and glaucoma.  As appropriate for age/gender, discussed screening for colorectal cancer, prostate cancer, breast cancer, and cervical cancer.   " Checklist reviewing preventive services available has been given to the patient.              Assessment & Plan     Mixed hyperlipidemia  Control uncertain, continue current medications at current doses pending labs  - atorvastatin (LIPITOR) 40 MG tablet  Dispense: 90 tablet; Refill: 1  - VENOUS COLLECTION  - Lipid Profile ([facility])    Benign essential hypertension  Well controlled, continue current medications at current doses   - atenolol (TENORMIN) 25 MG tablet  Dispense: 90 tablet; Refill: 1  - VENOUS COLLECTION  - COMPREHENSIVE METABOLIC PANEL ([facility])    Coronary artery disease involving autologous artery coronary bypass graft without angina pectoris  stable symptomatically , continue current medications at current doses   - atorvastatin (LIPITOR) 40 MG tablet  Dispense: 90 tablet; Refill: 1  - COMPREHENSIVE METABOLIC PANEL ([facility])    Atrial fibrillation, unspecified type (H)  Well controlled, continue current medications at current doses   - atenolol (TENORMIN) 25 MG tablet  Dispense: 90 tablet; Refill: 1  - rivaroxaban ANTICOAGULANT (XARELTO ANTICOAGULANT) 20 MG TABS tablet  Dispense: 90 tablet; Refill: 1    Encounter for Medicare annual wellness exam  discussed preventitive healthcare Continue to work on healthy diet and exercise, discussed healthy habits   Personalized Prevention Plan  You are due for the preventive services outlined below.  Your care team is available to assist you in scheduling these services.  If you have already completed any of these items, please share that information with your care team to update in your medical record.  Health Maintenance   Topic Date Due    ZOSTER IMMUNIZATION (1 of 2) Never done    MEDICARE ANNUAL WELLNESS VISIT  08/01/2024    LIPID  09/12/2024    RSV VACCINE (1 - 1-dose 75+ series) 03/12/2025 (Originally 7/10/2022)    BMP  03/12/2025    FALL RISK ASSESSMENT  10/22/2025    ADVANCE CARE PLANNING  10/28/2026    GLUCOSE  03/12/2027    DTAP/TDAP/TD IMMUNIZATION (4 - Td  "or Tdap) 06/04/2029    HEPATITIS C SCREENING  Completed    PHQ-2 (once per calendar year)  Completed    INFLUENZA VACCINE  Completed    Pneumococcal Vaccine: 65+ Years  Completed    COVID-19 Vaccine  Completed    HPV IMMUNIZATION  Aged Out    MENINGITIS IMMUNIZATION  Aged Out    RSV MONOCLONAL ANTIBODY  Aged Out        Special screening for malignant neoplasm of prostate    - VENOUS COLLECTION  - PSA Total ([facility])    Bilateral impacted cerumen  After unsuccessful attempt at manual cerumen removal , assistant did irrigate ears successfully, exam within normal limits afterward. Patient given instructions to avoid Q-tips and consider OTC wax treatments such as debrox prn   - Removal Impacted Cerumen Irrigation Unilateral (Ear Wash)            BMI  Estimated body mass index is 25.6 kg/m  as calculated from the following:    Height as of this encounter: 1.791 m (5' 10.5\").    Weight as of this encounter: 82.1 kg (181 lb).         FUTURE APPOINTMENTS:       - Follow-up visit in 6 mo  Regular exercise    No follow-ups on file.    Wallace Polk is a 79 year old, presenting for the following health issues:  Recheck Medication and Wellness Visit    HPI       Hyperlipidemia Follow-Up    Are you regularly taking any medication or supplement to lower your cholesterol?   Yes- atorvastatin  Are you having muscle aches or other side effects that you think could be caused by your cholesterol lowering medication?  No    Hypertension Follow-up    Do you check your blood pressure regularly outside of the clinic? Yes   Are you following a low salt diet? No  Are your blood pressures ever more than 140 on the top number (systolic) OR more   than 90 on the bottom number (diastolic), for example 140/90? No    Atrial Fibrillation Follow-up    Symptoms: no recent chest pain, significant palpitations, dizziness/lightheadedness, dyspnea, or increased peripheral edema.  Stroke prevention: DOAC (Eliquis, Xarelto, Pradaxa)        10/24/2022    " "10:34 AM 4/20/2023     9:33 AM 8/1/2023     9:30 AM 3/12/2024     9:12 AM 10/22/2024     9:18 AM   Date   Pulse 65 65 80 68 68     Current [id number] Score: 4 points, which represents a 4.8% annual risk of major embolic event, without anti-coagulation or an LAAO device.       Vascular Disease Follow-up    How often do you take nitroglycerin? Never  Do you take an aspirin every day? No  How many servings of fruits and vegetables do you eat daily?  2-3  On average, how many sweetened beverages do you drink each day (Examples: soda, juice, sweet tea, etc.  Do NOT count diet or artificially sweetened beverages)?   1  How many days per week do you exercise enough to make your heart beat faster? 7  How many minutes a day do you exercise enough to make your heart beat faster? 20 - 29  How many days per week do you miss taking your medication? 0        Review of Systems  Constitutional, HEENT, cardiovascular, pulmonary, gi and gu systems are negative, except as otherwise noted.      Objective    /82 (BP Location: Left arm, Patient Position: Chair, Cuff Size: Adult Regular)   Pulse 68   Temp 97.7  F (36.5  C) (Temporal)   Resp 20   Ht 1.791 m (5' 10.5\")   Wt 82.1 kg (181 lb)   BMI 25.60 kg/m    Body mass index is 25.6 kg/m .  Physical Exam   GENERAL: alert and no distress  EYES: Eyes grossly normal to inspection, PERRL and conjunctivae and sclerae normal  HENT: both ears with cerumen blockage- after removal ear canals and TM's normal, nose and mouth without ulcers or lesions  NECK: no adenopathy, no asymmetry, masses, or scars  RESP: lungs clear to auscultation - no rales, rhonchi or wheezes  CV: regular rate and rhythm, normal S1 S2, no S3 or S4, no murmur, click or rub, no peripheral edema  ABDOMEN: soft, nontender, no hepatosplenomegaly, no masses and bowel sounds normal  MS: no gross musculoskeletal defects noted, no edema  PSYCH: mentation appears normal, affect normal/bright    Office Visit on 03/12/2024 "   Component Date Value Ref Range Status    Carbon Dioxide 03/12/2024 29.9  20 - 32 mmol/L Final    Creatinine 03/12/2024 1.21  0.60 - 1.30 mg/dL Final    Glucose 03/12/2024 92  60 - 99 mg/dL Final    Sodium 03/12/2024 143.3  135 - 146 mmol/L Final    Potassium 03/12/2024 4.84  3.5 - 5.3 mmol/L Final    Chloride 03/12/2024 106.7  98 - 110 mmol/L Final    Protein Total 03/12/2024 7.0  6.1 - 8.1 g/dL Final    Albumin 03/12/2024 4.1  3.6 - 5.1 g/dL Final    Alkaline Phosphatase 03/12/2024 63  33 - 130 U/L Final    ALT 03/12/2024 11  0 - 32 U/L Final    AST 03/12/2024 14  0 - 35 U/L Final    Bilirubin Total 03/12/2024 0.8  0.2 - 1.2 mg/dL Final    Urea Nitrogen 03/12/2024 15  7 - 25 mg/dL Final    Calcium 03/12/2024 9.1  8.6 - 10.3 mg/dL Final    BUN/Creatinine Ratio 03/12/2024 12.4  6 - 32 Final    Globulin Calculated 03/12/2024 2.9  1.9 - 3.7 Final    A/G Ratio 03/12/2024 1.4  1 - 2.5 Final    Cholesterol 03/12/2024 190  0 - 199 mg/dL Final    Triglycerides 03/12/2024 110  0 - 149 mg/dL Final    HDL Cholesterol 03/12/2024 53  40 - 150 mg/dL Final    LDL Cholesterol Direct 03/12/2024 115  0 - 130 mg/dL Final    Cholesterol/HDL Ratio 03/12/2024 4  0 - 5 Final    WBC 03/12/2024 8.1  4.0 - 11 10*9/L Final    RBC Count 03/12/2024 4.75  4.4 - 5.9 10*12/L Final    Hemoglobin 03/12/2024 14.9  13.3 - 17.7 g/dL Final    Hematocrit 03/12/2024 47.0  40.0 - 53.0 % Final    MCV 03/12/2024 98.9  78 - 100 fL Final    MCH 03/12/2024 31.4  26 - 33 pg Final    MCHC 03/12/2024 31.7  31 - 36 g/dL Final    RDW 03/12/2024 12.3  % Final    Platelet Count 03/12/2024 276  150 - 375 10^9/L Final           Signed Electronically by: Jose David Griffin MD

## 2024-10-22 NOTE — NURSING NOTE
Jose David Zhang is here for a medication check, refill and Wellness. [facility].    Questioned patient about current smoking habits.  Pt. quit smoking some time ago.  PULSE regular  My Chart: active  CLASSIFICATION OF OVERWEIGHT AND OBESITY BY BMI                        Obesity Class           BMI(kg/m2)  Underweight                                    < 18.5  Normal                                         18.5-24.9  Overweight                                     25.0-29.9  OBESITY                     I                  30.0-34.9                             II                 35.0-39.9  EXTREME OBESITY             III                >40                            Patient's  BMI Body mass index is 25.6 kg/m .  http://hin.nhlbi.nih.gov/menuplanner/menu.cgi  Pre-visit planning  Immunizations - up to date  Colonoscopy -   Mammogram -   Asthma -   PHQ9 -    GRETA-7 -      The patient has verbalized that it is ok to leave a detailed voice message on the patient's cell phone with results/recommendations from this visit.

## 2024-10-23 LAB
ABBOTT PSA - QUEST: 2.16 NG/ML
ALBUMIN SERPL-MCNC: 4.5 G/DL (ref 3.6–5.1)
ALBUMIN/GLOB SERPL: 1.5 (CALC) (ref 1–2.5)
ALP SERPL-CCNC: 85 U/L (ref 35–144)
ALT SERPL-CCNC: 12 U/L (ref 9–46)
AST SERPL-CCNC: 18 U/L (ref 10–35)
BILIRUB SERPL-MCNC: 0.7 MG/DL (ref 0.2–1.2)
BUN SERPL-MCNC: 15 MG/DL (ref 7–25)
BUN/CREATININE RATIO: NORMAL (CALC) (ref 6–22)
CALCIUM SERPL-MCNC: 9.4 MG/DL (ref 8.6–10.3)
CHLORIDE SERPLBLD-SCNC: 105 MMOL/L (ref 98–110)
CHOLEST SERPL-MCNC: 165 MG/DL
CHOLEST/HDLC SERPL: 3 (CALC)
CO2 SERPL-SCNC: 24 MMOL/L (ref 20–32)
CREAT SERPL-MCNC: 1.15 MG/DL (ref 0.7–1.28)
EGFR (QUEST): 66 ML/MIN/1.73M2
GLOBULIN, CALCULATED - QUEST: 3 G/DL (CALC) (ref 1.9–3.7)
GLUCOSE - QUEST: 87 MG/DL (ref 65–99)
HDLC SERPL-MCNC: 55 MG/DL
LDLC SERPL CALC-MCNC: 91 MG/DL (CALC)
NONHDLC SERPL-MCNC: 110 MG/DL (CALC)
POTASSIUM SERPL-SCNC: 4.7 MMOL/L (ref 3.5–5.3)
PROT SERPL-MCNC: 7.5 G/DL (ref 6.1–8.1)
SODIUM SERPL-SCNC: 141 MMOL/L (ref 135–146)
TRIGL SERPL-MCNC: 98 MG/DL

## 2025-05-01 ENCOUNTER — OFFICE VISIT (OUTPATIENT)
Dept: FAMILY MEDICINE | Facility: CLINIC | Age: 80
End: 2025-05-01

## 2025-05-01 VITALS
SYSTOLIC BLOOD PRESSURE: 118 MMHG | TEMPERATURE: 97.4 F | RESPIRATION RATE: 20 BRPM | WEIGHT: 175 LBS | DIASTOLIC BLOOD PRESSURE: 62 MMHG | BODY MASS INDEX: 24.5 KG/M2 | HEIGHT: 71 IN | HEART RATE: 76 BPM

## 2025-05-01 DIAGNOSIS — E78.2 MIXED HYPERLIPIDEMIA: Primary | ICD-10-CM

## 2025-05-01 DIAGNOSIS — I25.810 CORONARY ARTERY DISEASE INVOLVING AUTOLOGOUS ARTERY CORONARY BYPASS GRAFT WITHOUT ANGINA PECTORIS: ICD-10-CM

## 2025-05-01 DIAGNOSIS — I10 BENIGN ESSENTIAL HYPERTENSION: ICD-10-CM

## 2025-05-01 DIAGNOSIS — I48.91 ATRIAL FIBRILLATION, UNSPECIFIED TYPE (H): ICD-10-CM

## 2025-05-01 LAB
ALBUMIN SERPL-MCNC: 4.4 G/DL (ref 3.6–5.1)
ALP SERPL-CCNC: 95 U/L (ref 33–130)
ALT 1742-6: 6 U/L (ref 0–32)
AST 1920-8: 15 U/L (ref 0–35)
BILIRUB SERPL-MCNC: 0.8 MG/DL (ref 0.2–1.2)
BUN SERPL-MCNC: 18 MG/DL (ref 7–25)
BUN/CREATININE RATIO: 14 (ref 6–32)
CALCIUM SERPL-MCNC: 9.5 MG/DL (ref 8.6–10.3)
CHLORIDE SERPLBLD-SCNC: 109.3 MMOL/L (ref 98–110)
CHOLEST SERPL-MCNC: 155 MG/DL (ref 0–199)
CHOLEST/HDLC SERPL: 3 {RATIO} (ref 0–5)
CO2 SERPL-SCNC: 24.4 MMOL/L (ref 20–32)
CREAT SERPL-MCNC: 1.25 MG/DL (ref 0.6–1.3)
ERYTHROCYTE [DISTWIDTH] IN BLOOD BY AUTOMATED COUNT: 12.2 %
GLUCOSE SERPL-MCNC: 82 MG/DL (ref 60–99)
HCT VFR BLD AUTO: 46.8 % (ref 40–53)
HDLC SERPL-MCNC: 54 MG/DL (ref 40–150)
HEMOGLOBIN: 14.8 G/DL (ref 13.3–17.7)
LDLC SERPL CALC-MCNC: 81 MG/DL (ref 0–129)
MCH RBC QN AUTO: 30.2 PG (ref 26–33)
MCHC RBC AUTO-ENTMCNC: 31.6 G/DL (ref 31–36)
MCV RBC AUTO: 95.5 FL (ref 78–100)
PLATELET COUNT - QUEST: 272 10^9/L (ref 150–375)
POTASSIUM SERPL-SCNC: 4.82 MMOL/L (ref 3.5–5.3)
PROT SERPL-MCNC: 7.6 G/DL (ref 6.1–8.1)
RBC # BLD AUTO: 4.9 10*12/L (ref 4.4–5.9)
SODIUM SERPL-SCNC: 136.8 MMOL/L (ref 135–146)
TRIGL SERPL-MCNC: 101 MG/DL (ref 0–149)
WBC # BLD AUTO: 10.6 10*9/L (ref 4–11)

## 2025-05-01 PROCEDURE — 36415 COLL VENOUS BLD VENIPUNCTURE: CPT | Performed by: FAMILY MEDICINE

## 2025-05-01 PROCEDURE — 85027 COMPLETE CBC AUTOMATED: CPT | Performed by: FAMILY MEDICINE

## 2025-05-01 PROCEDURE — 80061 LIPID PANEL: CPT | Performed by: FAMILY MEDICINE

## 2025-05-01 PROCEDURE — 99214 OFFICE O/P EST MOD 30 MIN: CPT | Performed by: FAMILY MEDICINE

## 2025-05-01 PROCEDURE — G2211 COMPLEX E/M VISIT ADD ON: HCPCS | Performed by: FAMILY MEDICINE

## 2025-05-01 PROCEDURE — 80053 COMPREHEN METABOLIC PANEL: CPT | Performed by: FAMILY MEDICINE

## 2025-05-01 RX ORDER — ATENOLOL 25 MG/1
25 TABLET ORAL DAILY
Qty: 90 TABLET | Refills: 1 | Status: SHIPPED | OUTPATIENT
Start: 2025-05-01

## 2025-05-01 RX ORDER — ATORVASTATIN CALCIUM 40 MG/1
40 TABLET, FILM COATED ORAL DAILY
Qty: 90 TABLET | Refills: 1 | Status: SHIPPED | OUTPATIENT
Start: 2025-05-01

## 2025-05-01 NOTE — PROGRESS NOTES
Assessment & Plan     Mixed hyperlipidemia  Control uncertain, continue current medications at current doses pending labs    Benign essential hypertension  Well controlled, continue current medications at current doses     Coronary artery disease involving autologous artery coronary bypass graft without angina pectoris  stable symptomatically continue current medications at current doses     Atrial fibrillation, unspecified type (H)  Well controlled, continue current medications at current doses               Follow-up 6 mo      Wallace Polk is a 79 year old, presenting for the following health issues:  Recheck Medication    HPI        Hyperlipidemia Follow-Up    Are you regularly taking any medication or supplement to lower your cholesterol?   Yes- atorvastatin  Are you having muscle aches or other side effects that you think could be caused by your cholesterol lowering medication?  No    Hypertension Follow-up    Do you check your blood pressure regularly outside of the clinic? Yes   Are you following a low salt diet? No  Are your blood pressures ever more than 140 on the top number (systolic) OR more   than 90 on the bottom number (diastolic), for example 140/90? No    BP Readings from Last 2 Encounters:   05/01/25 118/62   10/22/24 116/82     Atrial Fibrillation Follow-up    Symptoms: no recent chest pain, significant palpitations, dizziness/lightheadedness, dyspnea, or increased peripheral edema.  Stroke prevention: DOAC (Eliquis, Xarelto, Pradaxa)        4/20/2023     9:33 AM 8/1/2023     9:30 AM 3/12/2024     9:12 AM 10/22/2024     9:18 AM 5/1/2025     9:01 AM   Date   Pulse 65 80 68 68 76     Current [id number] Score: 4 points, which represents a 4.8% annual risk of major embolic event, without anti-coagulation or an LAAO device.       Vascular Disease Follow-up    How often do you take nitroglycerin? Never  Do you take an aspirin every day? No  How many servings of fruits and vegetables do you eat  "daily?  2-3  On average, how many sweetened beverages do you drink each day (Examples: soda, juice, sweet tea, etc.  Do NOT count diet or artificially sweetened beverages)?   1  How many days per week do you exercise enough to make your heart beat faster? 7  How many minutes a day do you exercise enough to make your heart beat faster? 20 - 29  How many days per week do you miss taking your medication? 0        Review of Systems  Constitutional, HEENT, cardiovascular, pulmonary, gi and gu systems are negative, except as otherwise noted.      Objective    /62 (BP Location: Left arm, Patient Position: Chair, Cuff Size: Adult Regular)   Pulse 76   Temp 97.4  F (36.3  C) (Temporal)   Resp 20   Ht 1.791 m (5' 10.5\")   Wt 79.4 kg (175 lb)   BMI 24.75 kg/m    Body mass index is 24.75 kg/m .  Physical Exam   GENERAL: alert and no distress  EYES: Eyes grossly normal to inspection, PERRL and conjunctivae and sclerae normal  HENT: ear canals and TM's normal, nose and mouth without ulcers or lesions  NECK: no adenopathy, no asymmetry, masses, or scars  RESP: lungs clear to auscultation - no rales, rhonchi or wheezes  CV: regular rate and rhythm, normal S1 S2, no S3 or S4, no murmur, click or rub, no peripheral edema  ABDOMEN: soft, nontender, no hepatosplenomegaly, no masses and bowel sounds normal  MS: no gross musculoskeletal defects noted, no edema  PSYCH: mentation appears normal, affect normal/bright    Office Visit on 10/22/2024   Component Date Value Ref Range Status    Abbott PSA 10/22/2024 2.16  < OR = 4.00 ng/mL Final    Comment: The total PSA value from this assay system is   standardized against the WHO standard. The test   result will be approximately 20% lower when compared   to the equimolar-standardized total PSA ([place]   [place]). Comparison of serial PSA results should be   interpreted with this fact in mind.     This test was performed using the Siemens   chemiluminescent method. Values obtained " from   different assay methods cannot be used  interchangeably. PSA levels, regardless of  value, should not be interpreted as absolute  evidence of the presence or absence of disease.      Cholesterol 10/22/2024 165  <200 mg/dL Final    HDL Cholesterol 10/22/2024 55  > OR = 40 mg/dL Final    Triglycerides 10/22/2024 98  <150 mg/dL Final    LDL Cholesterol Calculated 10/22/2024 91  mg/dL (calc) Final    Comment: Reference range: <100     Desirable range <100 mg/dL for primary prevention;    <70 mg/dL for patients with CHD or diabetic patients   with > or = 2 CHD risk factors.     LDL-C is now calculated using the Clay-Guardado   calculation, which is a validated novel method providing   better accuracy than the Friedewald equation in the   estimation of LDL-C.   Clay SS et al. JOHANNA. 2013;310(19): 7046-5105   (http://education.Sapho/faq/KEY723)      Cholesterol/HDL Ratio 10/22/2024 3.0  <5.0 (calc) Final    Non HDL Cholesterol 10/22/2024 110  <130 mg/dL (calc) Final    Comment: For patients with diabetes plus 1 major ASCVD risk   factor, treating to a non-HDL-C goal of <100 mg/dL   (LDL-C of <70 mg/dL) is considered a therapeutic   option.      Glucose 10/22/2024 87  65 - 99 mg/dL Final    Comment:               Fasting reference interval         Urea Nitrogen 10/22/2024 15  7 - 25 mg/dL Final    Creatinine 10/22/2024 1.15  0.70 - 1.28 mg/dL Final    EGFR ([facility]) 10/22/2024 66  > OR = 60 mL/min/1.73m2 Final    BUN/Creatinine Ratio 10/22/2024 SEE NOTE:  6 - 22 (calc) Final    Comment:    Not Reported: BUN and Creatinine are within     reference range.            Sodium 10/22/2024 141  135 - 146 mmol/L Final    Potassium 10/22/2024 4.7  3.5 - 5.3 mmol/L Final    Chloride 10/22/2024 105  98 - 110 mmol/L Final    Carbon Dioxide 10/22/2024 24  20 - 32 mmol/L Final    Calcium 10/22/2024 9.4  8.6 - 10.3 mg/dL Final    Protein Total 10/22/2024 7.5  6.1 - 8.1 g/dL Final    Albumin 10/22/2024 4.5  3.6 - 5.1  g/dL Final    Globulin Calculated 10/22/2024 3.0  1.9 - 3.7 g/dL (calc) Final    A/G Ratio 10/22/2024 1.5  1.0 - 2.5 (calc) Final    Bilirubin Total 10/22/2024 0.7  0.2 - 1.2 mg/dL Final    Alkaline Phosphatase 10/22/2024 85  35 - 144 U/L Final    AST 10/22/2024 18  10 - 35 U/L Final    ALT 10/22/2024 12  9 - 46 U/L Final           Signed Electronically by: Jose David Griffin MD